# Patient Record
Sex: FEMALE | Race: WHITE | NOT HISPANIC OR LATINO | Employment: OTHER | URBAN - METROPOLITAN AREA
[De-identification: names, ages, dates, MRNs, and addresses within clinical notes are randomized per-mention and may not be internally consistent; named-entity substitution may affect disease eponyms.]

---

## 2017-06-02 DIAGNOSIS — Z12.31 ENCOUNTER FOR SCREENING MAMMOGRAM FOR MALIGNANT NEOPLASM OF BREAST: ICD-10-CM

## 2017-07-11 ENCOUNTER — HOSPITAL ENCOUNTER (OUTPATIENT)
Dept: RADIOLOGY | Age: 67
Discharge: HOME | End: 2017-07-11
Payer: MEDICARE

## 2017-07-11 ENCOUNTER — GENERIC CONVERSION - ENCOUNTER (OUTPATIENT)
Dept: OTHER | Facility: OTHER | Age: 67
End: 2017-07-11

## 2017-07-11 DIAGNOSIS — Z12.31 ENCOUNTER FOR SCREENING MAMMOGRAM FOR MALIGNANT NEOPLASM OF BREAST: ICD-10-CM

## 2017-07-11 PROCEDURE — G0202 SCR MAMMO BI INCL CAD: HCPCS

## 2017-07-11 PROCEDURE — 77063 BREAST TOMOSYNTHESIS BI: CPT

## 2017-08-17 ENCOUNTER — ALLSCRIPTS OFFICE VISIT (OUTPATIENT)
Dept: OTHER | Facility: OTHER | Age: 67
End: 2017-08-17

## 2017-08-17 DIAGNOSIS — Z13.820 ENCOUNTER FOR SCREENING FOR OSTEOPOROSIS: ICD-10-CM

## 2017-08-17 DIAGNOSIS — Z13.220 ENCOUNTER FOR SCREENING FOR LIPOID DISORDERS: ICD-10-CM

## 2017-08-17 DIAGNOSIS — Z13.1 ENCOUNTER FOR SCREENING FOR DIABETES MELLITUS: ICD-10-CM

## 2017-08-17 DIAGNOSIS — R03.0 ELEVATED BLOOD PRESSURE READING WITHOUT DIAGNOSIS OF HYPERTENSION: ICD-10-CM

## 2017-08-17 DIAGNOSIS — Z00.00 ENCOUNTER FOR GENERAL ADULT MEDICAL EXAMINATION WITHOUT ABNORMAL FINDINGS: ICD-10-CM

## 2017-08-18 ENCOUNTER — TRANSCRIBE ORDERS (OUTPATIENT)
Dept: LAB | Facility: CLINIC | Age: 67
End: 2017-08-18

## 2017-08-18 ENCOUNTER — APPOINTMENT (OUTPATIENT)
Dept: LAB | Facility: CLINIC | Age: 67
End: 2017-08-18
Payer: MEDICARE

## 2017-08-18 ENCOUNTER — GENERIC CONVERSION - ENCOUNTER (OUTPATIENT)
Dept: OTHER | Facility: OTHER | Age: 67
End: 2017-08-18

## 2017-08-18 DIAGNOSIS — Z00.00 ENCOUNTER FOR GENERAL ADULT MEDICAL EXAMINATION WITHOUT ABNORMAL FINDINGS: ICD-10-CM

## 2017-08-18 DIAGNOSIS — Z13.220 ENCOUNTER FOR SCREENING FOR LIPOID DISORDERS: ICD-10-CM

## 2017-08-18 DIAGNOSIS — Z13.1 ENCOUNTER FOR SCREENING FOR DIABETES MELLITUS: ICD-10-CM

## 2017-08-18 DIAGNOSIS — R03.0 ELEVATED BLOOD PRESSURE READING WITHOUT DIAGNOSIS OF HYPERTENSION: ICD-10-CM

## 2017-08-18 LAB
ALBUMIN SERPL BCP-MCNC: 3.4 G/DL (ref 3.5–5)
ALP SERPL-CCNC: 57 U/L (ref 46–116)
ALT SERPL W P-5'-P-CCNC: 22 U/L (ref 12–78)
ANION GAP SERPL CALCULATED.3IONS-SCNC: 5 MMOL/L (ref 4–13)
AST SERPL W P-5'-P-CCNC: 15 U/L (ref 5–45)
BILIRUB SERPL-MCNC: 0.7 MG/DL (ref 0.2–1)
BUN SERPL-MCNC: 17 MG/DL (ref 5–25)
CALCIUM SERPL-MCNC: 8.4 MG/DL (ref 8.3–10.1)
CHLORIDE SERPL-SCNC: 103 MMOL/L (ref 100–108)
CHOLEST SERPL-MCNC: 218 MG/DL (ref 50–200)
CO2 SERPL-SCNC: 30 MMOL/L (ref 21–32)
CREAT SERPL-MCNC: 0.73 MG/DL (ref 0.6–1.3)
ERYTHROCYTE [DISTWIDTH] IN BLOOD BY AUTOMATED COUNT: 13.1 % (ref 11.6–15.1)
GFR SERPL CREATININE-BSD FRML MDRD: 86 ML/MIN/1.73SQ M
GLUCOSE P FAST SERPL-MCNC: 97 MG/DL (ref 65–99)
HCT VFR BLD AUTO: 39.7 % (ref 34.8–46.1)
HDLC SERPL-MCNC: 64 MG/DL (ref 40–60)
HGB BLD-MCNC: 13.1 G/DL (ref 11.5–15.4)
LDLC SERPL CALC-MCNC: 143 MG/DL (ref 0–100)
MCH RBC QN AUTO: 30.1 PG (ref 26.8–34.3)
MCHC RBC AUTO-ENTMCNC: 33 G/DL (ref 31.4–37.4)
MCV RBC AUTO: 91 FL (ref 82–98)
PLATELET # BLD AUTO: 234 THOUSANDS/UL (ref 149–390)
PMV BLD AUTO: 9.8 FL (ref 8.9–12.7)
POTASSIUM SERPL-SCNC: 4.4 MMOL/L (ref 3.5–5.3)
PROT SERPL-MCNC: 6.5 G/DL (ref 6.4–8.2)
RBC # BLD AUTO: 4.35 MILLION/UL (ref 3.81–5.12)
SODIUM SERPL-SCNC: 138 MMOL/L (ref 136–145)
TRIGL SERPL-MCNC: 56 MG/DL
TSH SERPL DL<=0.05 MIU/L-ACNC: 3.05 UIU/ML (ref 0.36–3.74)
WBC # BLD AUTO: 6.58 THOUSAND/UL (ref 4.31–10.16)

## 2017-08-18 PROCEDURE — 80053 COMPREHEN METABOLIC PANEL: CPT

## 2017-08-18 PROCEDURE — 85027 COMPLETE CBC AUTOMATED: CPT

## 2017-08-18 PROCEDURE — 84443 ASSAY THYROID STIM HORMONE: CPT

## 2017-08-18 PROCEDURE — 36415 COLL VENOUS BLD VENIPUNCTURE: CPT

## 2017-08-18 PROCEDURE — 80061 LIPID PANEL: CPT

## 2017-09-05 ENCOUNTER — HOSPITAL ENCOUNTER (OUTPATIENT)
Dept: RADIOLOGY | Age: 67
Discharge: HOME | End: 2017-09-05
Payer: MEDICARE

## 2017-09-05 DIAGNOSIS — Z13.820 ENCOUNTER FOR SCREENING FOR OSTEOPOROSIS: ICD-10-CM

## 2017-09-05 PROCEDURE — 77080 DXA BONE DENSITY AXIAL: CPT

## 2017-09-06 ENCOUNTER — GENERIC CONVERSION - ENCOUNTER (OUTPATIENT)
Dept: OTHER | Facility: OTHER | Age: 67
End: 2017-09-06

## 2018-01-10 NOTE — PROGRESS NOTES
Assessment    1  Encounter for preventive health examination (V70 0) (Z00 00)   2  Medicare welcome exam (V70 0) (Z00 00)    Plan  Health Maintenance    · Always use a seat belt and shoulder strap when riding or driving a motor vehicle ;  Status:Complete;   Done: 57ATN5129   · Begin a limited exercise program ; Status:Complete;   Done: 32QPP7815   · Decreasing the stress in your life may help your condition improve ; Status:Complete;    Done: 05LZV6042   · Eat a low fat and low cholesterol diet ; Status:Complete;   Done: 45CQY8820   · Regular aerobic exercise can help reduce stress ; Status:Complete;   Done: 95DGN2436   · Stretch and warm up your muscles during the first 10 minutes , then cool down your  muscles for the last 10 minutes of exercise ; Status:Complete;   Done: 82ZSW1688   · There are many exercise options for seniors ; Status:Complete;   Done: 31QDP1765   · There ways to avoid falling ; Status:Complete;   Done: 40FBU4855   · These are things you can do to prevent falls in and around the home ; Status:Complete;    Done: 32NTX2591   · Use a sun block product with an SPF of 15 or more ; Status:Complete;   Done:  56HXR2302   · Call [phone] if: You find a new or different kind of lump in your breast ;  Status:Complete;   Done: 26ZCQ4022   · Call [phone] if: You have any warning signs of skin cancer ; Status:Complete;    Done: 14KUX0334   · Call 911 if: You experience a new kind of chest pain (angina) or pressure ;  Status:Complete;   Done: 81CLP3630  Medicare welcome exam    · EKG/ECG- POC; Status:Active - Perform Order; Requested for:25Jgj4795; Sore throat    · Tyree Berry MD, Jeremiah Mcgraw (Otolaryngology) Physician Referral  Consult  Status: Hold For -  Scheduling  Requested for: 55UVW9573  are Referring to a non- Preferred Provider : Scheduling access issues  Care Summary provided  : Yes    Discussion/Summary  Impression: Welcome to Medicare Visit       Cardiovascular screening and counseling: the risks and benefits of screening were discussed, screening is current, counseling was given on maintaining a healthy diet and counseling was given on maintaining a healthy weight  Diabetes screening and counseling: the risks and benefits of screening were discussed, screening is current, counseling was given on maintaining a healthy diet and counseling was given on maintaining a healthy weight  Colorectal cancer screening and counseling: screening is current  Breast cancer screening and counseling: screening is current  Cervical cancer screening and counseling: screening not indicated and Hysterectomy  GYN 2016  Osteoporosis screening and counseling: screening is current and Through [facility] in March 2016  Abdominal aortic aneurysm screening and counseling: screening is current  Glaucoma screening and counseling: screening is current  HIV screening and counseling: screening not indicated  Immunizations: the patient declines the influenza vaccination, the patient declines the pneumococcal vaccination, hepatitis B vaccination series is not indicated at this time due to the patient's low risk of ruel the disease, the patient declines the Zostavax vaccine and Tdap vaccination up to date  Advance Directive Planning: complete and up to date  Advice and education were given regarding alcohol use, fall risk reduction, nutrition (non-diabetic), seat belt use, skin self-exam and sunscreen use  (None needed) Medical Equipment/Suppliers: None used  Patient Discussion: plan discussed with the patient  Self Referrals: No      Chief Complaint  Welcome to Medicare      History of Present Illness  HPI: Since last visit she had a colonoscopy and three band ligations of internal hemorrhoids with resolution of the rectal bleeding  She had [facility] screening in March 2016 with no aneurysm seen  Her throat still feels a little "tight" after her episode several weeks ago   She would like to see ENT  Had lost her voice with it  Lost her voice at the time  Never smoked  Welcome to [employer] and [facility] Visits: The patient is being seen for the welcome to medicare visit  Medicare Screening and Risk Factors   Hospitalizations: she has been previously hospitalizied and she has been hospitalized 4 times  Once per lifetime medicare screening tests: ECG has not been done and AAA screening US (3/16 negative [facility])  Medicare Screening Tests Risk Questions   Abdominal aortic aneurysm risk assessment: none indicated  Osteoporosis risk assessment: , female gender and over 48years of age  HIV risk assessment: none indicated  Drug and Alcohol Use: The patient has never smoked cigarettes  The patient reports never drinking alcohol  Alcohol concern:   The patient has no concerns about alcohol abuse  She has never used illicit drugs  Diet and Physical Activity: Current diet includes well balanced meals, low salt food choices and 1-2 cups of tea per day  She exercises 3-4 times per week  Exercise: aerobics  Mood Disorder and Cognitive Impairment Screening: She denies feeling down, depressed, or hopeless over the past two weeks  She denies feeling little interest or pleasure in doing things over the past two weeks  Cognitive impairment screening: denies difficulty learning/retaining new information, denies difficulty handling complex tasks, denies difficulty with reasoning, denies difficulty with spatial ability and orientation, denies difficulty with language and denies difficulty with behavior  Functional Ability/Level of Safety: Hearing is normal bilaterally and a hearing aid is not used  The patient is currently able to do activities of daily living without limitations, able to participate in social activities without limitations and able to drive without limitations   Activities of daily living details: does not need help using the phone, no transportation help needed, does not need help shopping, no meal preparation help needed, does not need help doing housework, does not need help doing laundry, does not need help managing medications and does not need help managing money  Fall risk factors: The patient fell 0 times in the past 12 months , no polypharmacy, no alcohol use, no antidepressant use, no deconditioning, no sedative use, no urinary incontinence, no antihypertensive use and no previous fall  Home safety risk factors:  no unfamiliar surroundings, no loose rugs, no poor household lighting, no uneven floors, no household clutter, grab bars in the bathroom and handrails on the stairs  Advance Directives: Advance directives: living will, but no durable power of  for health care directives and no advance directives  Co-Managers and Medical Equipment/Suppliers: See Patient Care Team      Patient Care Team    Care Team Member Role Specialty Office Number   Rocío Armando MD  Orthopedic Surgery [phone]   Mary ZAMORA  Family Medicine [phone]     Review of Systems    Constitutional: no fever and no malaise  Eyes: no eye pain, eyes not red, no itching of the eyes and no blurred vision  ENT: as noted in HPI  Cardiovascular: no chest pain, no palpitations and no lower extremity edema  Respiratory: no shortness of breath, no wheezing and no cough  Gastrointestinal: no abdominal pain, no abdominal bloating and no abdominal cramps  Genitourinary: no dysuria, no urinary frequency and no urinary urgency  Musculoskeletal: no diffuse joint pain, no generalized muscle aches, no joint swelling and no joint stiffness  Integumentary and Breasts: no rashes and no skin lesions  Psychiatric: no anxiety and no depression  Endocrine: no hot flashes  Hematologic and Lymphatic: no tendency for easy bleeding and no tendency for easy bruising  Active Problems    1  Blood pressure elevated without history of HTN (796 2) (R03 0)   2   Primary osteoarthritis of right knee (715 16) (M17 11)   3  Refused influenza vaccine (V64 06) (Z28 21)   4  Sore throat (462) (J02 9)    Past Medical History    · Acute upper respiratory infection (465 9) (J06 9)   · History of Depression (311) (F32 9)   · History of Fracture Of The Ankle (824 8)   · History of Ganglion Of The Left Wrist (727 42)   · History of fatigue (V13 89) (Q36 148)   · History of hyperlipidemia (V12 29) (Z86 39)   · History of X-Ray Wrist Fracture    Surgical History    · History of Colonoscopy (Fiberoptic)   · History of Hysterectomy   · History of Multiple Ligations Of Internal Hemorrhoids   · History of Neuroplasty Decompression Median Nerve At Carpal Tunnel   · History of Total Abdominal Hysterectomy   · History of Tubal Ligation    Family History  Mother    · Family history of Mother  At Age 66  Father    · Family history of Father  At Age 80  Sister    · Family history of Coronary Artery Disease (V17 49)  Brother    · Family history of Esophageal Cancer (V16 0)   · Family history of Liver Cancer   · Family history of Suicide Completion    Social History    · Educational Level - Has High School Diploma   · Marital History - Currently    · 3 daughters   · Never A Smoker   · Never Drank Alcohol   · Occupation:   · housewife    Current Meds   1  Calcium 600-200 MG-UNIT Oral Tablet; Therapy: (SOZYPVQD:19LNA4370) to Recorded   2  Fluocinolone Acetonide 0 025 % External Ointment; USE TOPICALLY TWICE A DAY AS   NEEDED; Therapy: 37BYJ8694 to (Last Rx:09Aao1208)  Requested for: 98KUY2136 Ordered   3  Multiple Vitamins Oral Tablet; TAKE 1 TABLET DAILY; Therapy: 90OHN7200 to (Evaluate:03Ifh1010) Recorded    Allergies    1   Sulfa Drugs    Immunizations   1    Influenza  Permanently Deferred: Medical Deferral, PT REFUSED, 20KXH9835    Tdap  2013     Vitals  Signs    Systolic: 104  Diastolic: 84  Heart Rate: 72  Temperature: 98 1 F  Pain Scale: 2  Height: 5 ft 4 in  Weight: 156 lb 4 96 oz  BMI Calculated: 26 83  BSA Calculated: 1 77    Physical Exam    Constitutional   General appearance: No acute distress, well appearing and well nourished  Head and Face   Head and face: Normal     Eyes   Conjunctiva and lids: No swelling, erythema or discharge  Pupils and irises: Equal, round, reactive to light  Ears, Nose, Mouth, and Throat   External inspection of ears and nose: Normal     Otoscopic examination: Tympanic membranes translucent with normal light reflex  Canals patent without erythema  (Non-occluding wax AU)   Hearing: Normal     Nasal mucosa, septum, and turbinates: Normal without edema or erythema  Lips, teeth, and gums: Normal, good dentition  Oropharynx: Normal with no erythema, edema, exudate or lesions  Neck   Neck: Supple, symmetric, trachea midline, no masses  Thyroid: Normal, no thyromegaly  Pulmonary   Respiratory effort: No increased work of breathing or signs of respiratory distress  Auscultation of lungs: Clear to auscultation  Cardiovascular   Palpation of heart: Normal PMI, no thrills  Auscultation of heart: Normal rate and rhythm, normal S1 and S2, no murmurs  Carotid pulses: 2+ bilaterally  no bruit heard over the right carotid and no bruit heard over the left carotid  Abdominal aorta: Normal     Femoral pulses: 2+ bilaterally  Pedal pulses: 2+ bilaterally  Examination of extremities for edema and/or varicosities: Normal     Abdomen   Abdomen: Non-tender, no masses  Liver and spleen: No hepatomegaly or splenomegaly  Lymphatic   Palpation of lymph nodes in neck: No lymphadenopathy  Palpation of lymph nodes in groin: No lymphadenopathy  Musculoskeletal   Gait and station: Normal     Digits and nails: Normal without clubbing or cyanosis  Joints, bones, and muscles: Normal     Skin   Skin and subcutaneous tissue: Normal without rashes or lesions  Neurologic   Cranial nerves: Cranial nerves II-XII intact      Cortical function: Normal mental status  Psychiatric   Judgment and insight: Normal     Orientation to person, place, and time: Normal     Recent and remote memory: Intact      Mood and affect: Normal        Results/Data  A 12 lead ECG was performed and was normal       Signatures   Electronically signed by : SVETA Santizo ; Aug 16 2016  8:38AM EST                       (Author)

## 2018-01-10 NOTE — RESULT NOTES
Verified Results  MAMMO SCREENING BILATERAL W 3D & CAD [id number] 07:05AM Benjamen Flight Order Number: [id number]    - Patient Instructions: To schedule this appointment, please contact Central Scheduling at [phone]  Do not wear any perfume, powder, lotion or deodorant on breast or underarm area  Please bring your doctors order, referral (if needed) and insurance information with you on the day of the test  Failure to bring this information may result in this test being rescheduled  Arrive 15 minutes prior to your appointment time to register  On the day of your test, please bring any prior mammogram or breast studies with you that were not performed at a [facility]  Failure to bring prior exams may result in your test needing to be rescheduled  Test Name Result Flag Reference   MAMMO SCREENING BILATERAL W 3D & CAD (Report)     Patient History:   Family history of breast cancer at age 48 in maternal half    sister, prostate cancer at age 61 in brother  Took hormonal contraceptives for 3 months  Patient has never smoked  Patient's BMI is 25 9  Reason for exam: screening, asymptomatic  Mammo Screening Bilateral W DBT and CAD: July 11, 2017 - Check In   #: [de-identified]   2D/3D Procedure   3D views: Bilateral MLO view(s) were taken  2D views: Bilateral CC view(s) were taken  Technologist: RT Cary(R)(M)   Prior study comparison: June 28, 2016, mammo screening bilateral    W CAD performed at [address] [place] [address]  June 23, 2015,    digital bilateral screening mammogram performed at [address] [place] [address]  June 17, 2014, digital bilateral screening    mammogram performed at [address] [place] [address]  June 11, 2013, digital bilateral screening mammogram performed at [address] [place] [address]  June 8, 2012, digital bilateral    screening mammogram performed at [address] [place] [address]       The breast tissue is heterogeneously dense, potentially limiting    the sensitivity of mammography  Patient risk, included in this    report, assists in determining the appropriate screening regimen    (such as 3-D mammography or the inclusion of automated breast    ultrasound or MRI)  3-D mammography may also remain indicated as    screening  No dominant soft tissue mass, architectural distortion or    suspicious calcifications are noted in either breast   The skin    and nipple contours are within normal limits  No significant changes when compared with prior studies  ACR BI-RADSï¾® Assessments: BiRad:1 - Negative     Recommendation:   Routine screening mammogram of both breasts in 1 year  A    reminder letter will be scheduled  The patient is scheduled in a reminder system  8-10% of cancers will be missed on mammography  Management of a    palpable abnormality must be based on clinical grounds  Patients    will be notified of their results via letter from our facility  Accredited by [employer] of Radiology and FDA       Transcription Location: ANDREW Olivia 98: [id number]     Risk Value(s):   Tyrer-Cuzick 10 Year: 3 500%, Tyrer-Cuzick Lifetime: 7 000%,    Myriad Table: 1 5%, YEIMI 5 Year: 1 3%, NCI Lifetime: 4 8%

## 2018-01-10 NOTE — RESULT NOTES
Verified Results  (1) CBC/ PLT (NO DIFF) [id number] 08:00AM Reagan Lynn Order Number: [id number]     Test Name Result Flag Reference   HEMATOCRIT 39 7 %  34 8-46 1   HEMOGLOBIN 13 1 g/dL  11 5-15 4   MCHC 33 0 g/dL  31 4-37 4   MCH 30 1 pg  26 8-34 3   MCV 91 fL  82-98   PLATELET COUNT 787 Thousands/uL  149-390   RBC COUNT 4 35 Million/uL  3 81-5 12   RDW 13 1 %  11 6-15 1   WBC COUNT 6 58 Thousand/uL  4 31-10 16   MPV 9 8 fL  8 9-12 7     (1) COMPREHENSIVE METABOLIC PANEL [id number] 37:21DU Reagan Lynn Order Number: [id number]     Test Name Result Flag Reference   SODIUM 138 mmol/L  136-145   POTASSIUM 4 4 mmol/L  3 5-5 3   CHLORIDE 103 mmol/L  100-108   CARBON DIOXIDE 30 mmol/L  21-32   ANION GAP (CALC) 5 mmol/L  4-13   BLOOD UREA NITROGEN 17 mg/dL  5-25   CREATININE 0 73 mg/dL  0 60-1 30   Standardized to IDMS reference method   CALCIUM 8 4 mg/dL  8 3-10 1   BILI, TOTAL 0 70 mg/dL  0 20-1 00   ALK PHOSPHATAS 57 U/L     ALT (SGPT) 22 U/L  12-78   Specimen collection should occur prior to Sulfasalazine administration due to the potential for falsely depressed results  AST(SGOT) 15 U/L  5-45   Specimen collection should occur prior to Sulfasalazine administration due to the potential for falsely depressed results  ALBUMIN 3 4 g/dL L 3 5-5 0   TOTAL PROTEIN 6 5 g/dL  6 4-8 2   eGFR 86 ml/min/1 73sq m     National Kidney Disease Education Program recommendations are as follows:  GFR calculation is accurate only with a steady state creatinine  Chronic Kidney disease less than 60 ml/min/1 73 sq  meters  Kidney failure less than 15 ml/min/1 73 sq  meters  GLUCOSE FASTING 97 mg/dL  65-99   Specimen collection should occur prior to Sulfasalazine administration due to the potential for falsely depressed results  Specimen collection should occur prior to Sulfapyridine administration due to the potential for falsely elevated results       (1) LIPID PANEL, FASTING [id number] 08: Christianne Bain Order Number: [id number]     Test Name Result Flag Reference   CHOLESTEROL 218 mg/dL H    HDL,DIRECT 64 mg/dL H 40-60   Specimen collection should occur prior to Metamizole administration due to the potential for falsley depressed results  LDL CHOLESTEROL CALCULATED 143 mg/dL H 0-100   Triglyceride:        Normal ??? ??? ??? ??? ??? ??? ??? <150 mg/dl   ??? ??? ???Borderline High ??? ??? 150-199 mg/dl   ??? ??? ? ?? High ??? ??? ??? ??? ??? ??? ??? 200-499 mg/dl   ??? ??? ? ??Very High ??? ??? ??? ??? ??? >499 mg/dl      Cholesterol:       Desirable ??? ??? ??? ??? <200 mg/dl   ??? ??? Borderline High ??? 200-239 mg/dl   ??? ??? High ??? ??? ??? ??? ??? ??? >239 mg/dl      HDL Cholesterol:       High ??? ???>59 mg/dL   ??? ??? Low ??? ??? <41 mg/dL      This screening LDL is a calculated result  It does not have the accuracy of the Direct Measured LDL in the monitoring of patients with hyperlipidemia and/or statin therapy  Direct Measure LDL (NDG308) must be ordered separately in these patients  TRIGLYCERIDES 56 mg/dL  <=150   Specimen collection should occur prior to N-Acetylcysteine or Metamizole administration due to the potential for falsely depressed results  (1) TSH WITH FT4 REFLEX 26Enx2635 08:00AM Mikayla Barahona Order Number: [id number]     Test Name Result Flag Reference   TSH 3 046 uIU/mL  0 358-3 740   Patients undergoing fluorescein dye angiography may retain small amounts of fluorescein in the body for 48-72 hours post procedure  Samples containing fluorescein can produce falsely depressed TSH values  If the patient had this procedure,a specimen should be resubmitted post fluorescein clearance            The recommended reference ranges for TSH during pregnancy are as follows:  First trimester 0 1 to 2 5 uIU/mL  Second trimester  0 2 to 3 0 uIU/mL  Third trimester 0 3 to 3 0 uIU/m

## 2018-01-12 NOTE — RESULT NOTES
Verified Results  * DXA BONE DENSITY SPINE HIP AND PELVIS 96Vuq9611 04:34PM Loly John Order Number: [id number]    - Patient Instructions: To schedule this appointment, please contact Central Scheduling at [phone]  Test Name Result Flag Reference   DXA BONE DENSITY SPINE HIP AND PELVIS (Report)     CENTRAL DXA SCAN     CLINICAL HISTORY:  77year old post-menopausal  female risk factors include estrogen deficient, osteopenia follow-up     TECHNIQUE: Bone densitometry was performed using a Horizon A bone densitometer  Regions of interest appear properly placed  There are no obvious fractures or other confounding variables which could limit the study  COMPARISON: 2009     RESULTS:    LUMBAR SPINE: L1-L4:   BMD 0 913 gm/cm2   T-score -1 2   Z-score 0 7     LEFT TOTAL HIP:   BMD 0 797 gm/cm2   T-score -1 2   Z-score 0 1     LEFT FEMORAL NECK:   BMD 0 660 gm/cm2   T-score -1 7   Z-score -0 1             IMPRESSION:   1  Based on the [facility] classification, the T-score of -1 7 in the left femoral neck is consistent with low bone mineral density  2  Since the prior study, there has been an insignificant decrease of the BMD in the lumbar spine of -0 014 gm/cm2 or -1 5%  In the left hip, there has been a significant decrease in BMD of -0 092 gm/cm2 or -10 3%  This decrease in the left hip does    exceed our own least significant change and, therefore, is statistically significant within 95% confidence level  3  Any secondary causes of low bone mineral density should be excluded prior to treatment, if clinically indicated  4  A daily intake of at least 1200 mg calcium and 800 to 1000 IU of Vitamin D, as well as weight bearing and muscle strengthening exercise, fall prevention and avoidance of tobacco and excessive alcohol intake as basic preventive measures are suggested  5  Repeat DXA in 18 - 24 months, on the same machine, as clinically indicated           The 10 year risk of hip fracture is 2 2%, with the 10 year risk of major osteoporotic fracture being 16%, as calculated by the WHO fracture risk assessment tool (FRAX)  The current NOF guidelines recommend treating patients with FRAX 10 year risk score    of >3% for hip fracture and >20% for major osteoporotic fracture  WHO CLASSIFICATION:   Normal (a T-score of -1 0 or higher)   Low bone mineral density (a T-score of less than -1 0 but higher than -2 5)   Osteoporosis (a T-score of -2 5 or less)   Severe osteoporosis (a T-score of -2 5 or less with a fragility fracture)             Workstation performed: [id number]     Signed by:   Fabienne Correa MD   9/6/17

## 2018-01-12 NOTE — RESULT NOTES
Verified Results  (1) URINALYSIS (will reflex a microscopy if leukocytes, occult blood, protein or nitrites are not within normal limits) [id number] 08:28AM [employer]     Test Name Result Flag Reference   COLOR Yellow     CLARITY Clear     SPECIFIC GRAVITY UA 1 010  1 003-1 030   PH UA 7 0  4 5-8 0   LEUKOCYTE ESTERASE UA Moderate A Negative   NITRITE UA Negative  Negative   PROTEIN UA Negative mg/dl  Negative   GLUCOSE UA Negative mg/dl  Negative   KETONES UA Negative mg/dl  Negative   UROBILINOGEN UA 0 2 E U /dl  0 2, 1 0 E U /dl   BILIRUBIN UA Negative  Negative   BLOOD UA Negative  Negative     (1) URINALYSIS (will reflex a microscopy if leukocytes, occult blood, protein or nitrites are not within normal limits) [id number] 08:28AM [employer]     Test Name Result Flag Reference   BACTERIA None Seen /hpf  None Seen, Occasional   EPITHELIAL CELLS Occasional /hpf  None Seen, Occasional   RBC UA 0-1 /hpf A None Seen   WBC UA 1-2 /hpf A None Seen     (1) COMPREHENSIVE METABOLIC PANEL [id number] 22:58HR [employer]     Test Name Result Flag Reference   GLUCOSE,RANDM 97 mg/dL     If the patient is fasting, the ADA then defines impaired fasting glucose as > 100 mg/dL and diabetes as > or equal to 123 mg/dL     SODIUM 138 mmol/L  136-145   POTASSIUM 4 0 mmol/L  3 5-5 3   CHLORIDE 103 mmol/L  100-108   CARBON DIOXIDE 30 mmol/L  21-32   ANION GAP (CALC) 5 mmol/L  4-13   BLOOD UREA NITROGEN 15 mg/dL  5-25   CREATININE 0 70 mg/dL  0 60-1 30   Standardized to IDMS reference method   CALCIUM 8 4 mg/dL  8 3-10 1   BILI, TOTAL 0 50 mg/dL  0 20-1 00   ALK PHOSPHATAS 57 U/L     ALT (SGPT) 21 U/L  12-78   AST(SGOT) 15 U/L  5-45   ALBUMIN 3 6 g/dL  3 5-5 0   TOTAL PROTEIN 6 7 g/dL  6 4-8 2   eGFR Non-African American      >60 0 ml/min/1 73sq m   [facility] Disease Education Program recommendations are as follows:  GFR calculation is accurate only with a steady state creatinine  Chronic Kidney disease less than 60 ml/min/1 73 sq  meters  Kidney failure less than 15 ml/min/1 73 sq  meters  (1) TSH WITH FT4 REFLEX 22Jun2016 07:55AM [employer]     Test Name Result Flag Reference   TSH 4 843 uIU/mL H 0 358-3 740   A FT4 has been ordered  Patients undergoing fluorescein dye angiography may retain small amounts of fluorescein in the body for 48-72 hours post procedure  Samples containing fluorescein can produce falsely depressed TSH values  If the patient had this procedure,a specimen should be resubmitted post fluorescein clearance  The recommended reference ranges for TSH during pregnancy are as follows:  First trimester 0 1 to 2 5 uIU/mL  Second trimester  0 2 to 3 0 uIU/mL  Third trimester 0 3 to 3 0 uIU/m     (1) LIPID PANEL, FASTING 22Jun2016 07:55AM [facility]     Test Name Result Flag Reference   CHOLESTEROL 201 mg/dL H    HDL,DIRECT 55 mg/dL  40-60   Specimen collection should occur prior to Metamizole administration due to the potential for falsely depressed results  LDL CHOLESTEROL CALCULATED 132 mg/dL H 0-100   Triglyceride:         Normal              <150 mg/dl       Borderline High    150-199 mg/dl       High               200-499 mg/dl       Very High          >499 mg/dl  Cholesterol:         Desirable        <200 mg/dl      Borderline High  200-239 mg/dl      High             >239 mg/dl  HDL Cholesterol:        High    >59 mg/dL      Low     <41 mg/dL  LDL CALCULATED:    This screening LDL is a calculated result  It does not have the accuracy of the Direct Measured LDL in the monitoring of patients with hyperlipidemia and/or statin therapy  Direct Measure LDL (DBN222) must be ordered separately in these patients  TRIGLYCERIDES 72 mg/dL  <=150   Specimen collection should occur prior to N-Acetylcysteine or Metamizole administration due to the potential for falsely depressed results       (1) TSH WITH FT4 REFLEX 22Jun2016 07:55AM [facility]     Test Name Result Flag Reference   T4,FREE 0 94 ng/dL  0 76-1 46     (1) CBC/PLT/DIFF [id number] 07:30AM Rosario Mcgee     Test Name Result Flag Reference   WBC COUNT 5 78 Thousand/uL  4 31-10 16   RBC COUNT 4 51 Million/uL  3 81-5 12   HEMOGLOBIN 13 6 g/dL  11 5-15 4   HEMATOCRIT 41 1 %  34 8-46  1   MCV 91 fL  82-98   MCH 30 2 pg  26 8-34 3   MCHC 33 1 g/dL  31 4-37 4   RDW 13 2 %  11 6-15 1   MPV 9 9 fL  8 9-12 7   PLATELET COUNT 218 Thousands/uL  149-390   NEUTROPHILS RELATIVE PERCENT 55 %  43-75   LYMPHOCYTES RELATIVE PERCENT 36 %  14-44   MONOCYTES RELATIVE PERCENT 7 %  4-12   EOSINOPHILS RELATIVE PERCENT 2 %  0-6   BASOPHILS RELATIVE PERCENT 0 %  0-1   NEUTROPHILS ABSOLUTE COUNT 3 18 Thousands/?L  1 85-7 62   LYMPHOCYTES ABSOLUTE COUNT 2 08 Thousands/?L  0 60-4 47   MONOCYTES ABSOLUTE COUNT 0 39 Thousand/?L  0 17-1 22   EOSINOPHILS ABSOLUTE COUNT 0 11 Thousand/?L  0 00-0 61   BASOPHILS ABSOLUTE COUNT 0 02 Thousands/?L  0 00-0 10       Discussion/Summary   Everything looks OK  Cholesterol is much improved   One thyroid test (TSH) is very minimally abnormal but the other thyroid test is normal  Call if you remain fatigued; otherwise I'll see you for your physical  Dr Genevieve Kaiser

## 2018-01-12 NOTE — PROGRESS NOTES
Assessment    1  Screening for osteoporosis (V82 81) (Z13 820)   2  Medicare welcome exam (V70 0) (Z00 00)   3  Screening for diabetes mellitus (V77 1) (Z13 1)   4  Encounter for screening for lipid disorder (V77 91) (Z13 220)   5  Need for pneumococcal vaccination (V03 82) (Z23)   6  Vaccination refused by patient (V64 06) (Z28 21)    Plan  Blood pressure elevated without history of HTN, Health Maintenance, Encounter for  screening for lipid disorder, Screening for diabetes mellitus    · (1) CBC/ PLT (NO DIFF); Status:Active; Requested for:[id number];    · (1) TSH WITH FT4 REFLEX; Status:Active; Requested for:26Qyb1955;   Depression screening    · *VB - PHQ-9 Tool; Status:Complete;   Done: 14XVO0993 08:04AM  Health Maintenance, Encounter for screening for lipid disorder, Screening for diabetes  mellitus    · (1) COMPREHENSIVE METABOLIC PANEL; Status:Active; Requested for:93Vga4550;    · (1) LIPID PANEL, FASTING; Status:Active; Requested for:71Vzc9201;   Need for pneumococcal vaccination    · Prevnar 13 Intramuscular Suspension  Screening for genitourinary condition    · *VB - Urinary Incontinence Screen (Dx Z13 89 Screen for UI); Status:Complete;   Done:  17FRD0268 08:11AM  Screening for neurological condition    · *VB - Fall Risk Assessment  (Dx Z13 89 Screen for Neurologic Disorder);  Status:Complete;   Done: 20VAO8505 08:13AM  Screening for osteoporosis    · * DXA BONE DENSITY SPINE HIP AND PELVIS; Status:Active; Requested  for:69Rdt1763;     Discussion/Summary  Impression: Subsequent Annual Wellness Visit, with preventive exam as well as age and risk appropriate counseling completed       Cardiovascular screening and counseling: the risks and benefits of screening were discussed, counseling was given on maintaining a healthy diet, counseling was given on maintaining a healthy weight, counseling was given on ways to improve cholesterol, counseling was given on ways to improve blood pressure, counseling was given on ways to improve exercise tolerance, due for a lipid panel and Dx - V81 2 Screen for CV Disorder  Diabetes screening and counseling: the risks and benefits of screening were discussed, counseling was given on maintaining a healthy diet, counseling was given on maintaining a healthy weight, counseling was given on ways to improve physical activity, due for blood glucose and Dx - V77 1 Screen for DM  Colorectal cancer screening and counseling: the risks and benefits of screening were discussed, screening is current, counseling was given on ways to eat a high fiber diet and Dx - V76 51 Screen for CRC  Breast cancer screening and counseling: the risks and benefits of screening were discussed and screening is current  Cervical cancer screening and counseling: screening not indicated  Osteoporosis screening and counseling: the risks and benefits of screening were discussed, counseling was given on obtaining adequate amounts of calcium and vitamin D on a daily basis, counseling was given on the importance of regular weightbearing exercise and due for DXA axial skeleton  Immunizations: the patient declines the influenza vaccination, the risks and benefits of pneumococcal vaccination were discussed with the patient, pneumococcal vaccine due today, the risks and benefits of the Zostavax vaccine were discussed with the patient and the patient declines the Zostavax vaccine  Advance Directive Planning: not complete, paperwork and instructions were given to the patient, she was encouraged to follow-up with me to discuss her questions and/or decisions  Patient Discussion: plan discussed with the patient, follow-up visit needed in one year  Possible side effects of new medications were reviewed with the patient/guardian today  The treatment plan was reviewed with the patient/guardian   The patient/guardian understands and agrees with the treatment plan      Chief Complaint  MEDICARE WELLNESS VISIT      History of Present Illness  HPI: Colonoscopy: 2016, repeat 10 yrs  Mammogram: 2017  DXA Scan: ?? Prevnar-13: never  Pneumovax: never  Zostavax: declines   Welcome to [employer] and Wellness Visits: The patient is being seen for the welcome to medicare visit  Medicare Screening and Risk Factors   Hospitalizations: no previous hospitalizations  Medicare Screening Tests Risk Questions   Drug and Alcohol Use: The patient has never smoked cigarettes  The patient reports never drinking alcohol  She has never used illicit drugs  Diet and Physical Activity: Current diet includes well balanced meals, 2 servings of fruit per day, 2 servings of whole grains per day, 2 cups of tea per day and 6-8 WATER  She exercises 5 times per week  Exercise: walking, AROBIC 30 minutes per day  Mood Disorder and Cognitive Impairment Screening: PHQ-9 Depression Scale   Over the past 2 weeks, how often have you been bothered by the following problems? 1 ) Little interest or pleasure in doing things? Not at all    2 ) Feeling down, depressed or hopeless? Not at all    3 ) Trouble falling asleep or sleeping too much? Not at all    4 ) Feeling tired or having little energy? Not at all    5 ) Poor appetite or overeating? Not at all    6 ) Feeling bad about yourself, or that you are a failure, or have let yourself or your family down? Not at all    7 ) Trouble concentrating on things, such as reading a newspaper or watching television? Not at all  How difficult have these problems made it for you to do your work, take care of things at home, or get along with people? Not at all  She denies feeling down, depressed, or hopeless over the past two weeks  She denies feeling little interest or pleasure in doing things over the past two weeks     Cognitive impairment screening: denies difficulty learning/retaining new information, denies difficulty handling complex tasks, denies difficulty with reasoning, denies difficulty with spatial ability and orientation, denies difficulty with language and denies difficulty with behavior  Functional Ability/Level of Safety: Activities of daily living details: does not need help using the phone, no transportation help needed, does not need help shopping, no meal preparation help needed, does not need help doing housework, does not need help doing laundry, does not need help managing medications and does not need help managing money  Fall risk factors: The patient fell 0 times in the past 12 months  Home safety risk factors:  no unfamiliar surroundings, no loose rugs, no poor household lighting, no uneven floors, no household clutter, grab bars in the bathroom and handrails on the stairs  Advance Directives: Advance directives: no living will, no durable power of  for health care directives and no advance directives  Co-Managers and Medical Equipment/Suppliers: See Patient Care Team      Patient Care Team    Care Team Member Role Specialty Office Number   Cinthya Dorothea MCCALLUM  Orthopedic Surgery [phone]   Joe ZAMORA  Family Medicine [phone]     Review of Systems    Constitutional: no fever, no chills, no malaise, no fatigue and no anorexia  Head and Face: no facial pain and no facial pressure  Eyes: no eye pain and no blurred vision  ENT: no earache, no nasal congestion and no sore throat  Cardiovascular: no chest pain, no palpitations and no lower extremity edema  Respiratory: no shortness of breath and no cough  Gastrointestinal: no abdominal pain, no nausea, no vomiting, no diarrhea, no constipation, no bright red blood per rectum and no melena  Genitourinary: no dysuria, no urinary frequency, no urinary urgency and no flank pain  Musculoskeletal: no diffuse joint pain, no generalized muscle aches and no back pain  Integumentary and Breasts: no rashes and no skin lesions  Neurological: no headache, no confusion, no dizziness, no fainting and no paresthesias  Psychiatric: no anxiety and no depression  Endocrine: no hot flashes and no night sweats  Hematologic and Lymphatic: no swollen glands, no tendency for easy bleeding, no tendency for easy bruising and no jaundice  Active Problems    1  Blood pressure elevated without history of HTN (796 2) (R03 0)   2  Encounter for screening mammogram for breast cancer (V76 12) (Z12 31)   3  Medicare welcome exam (V70 0) (Z00 00)   4  Primary osteoarthritis of right knee (715 16) (M17 11)   5  Refused influenza vaccine (V64 06) (Z28 21)   6  Sore throat (462) (J02 9)    Past Medical History    · Acute upper respiratory infection (465 9) (J06 9)   · History of Depression (311) (F32 9)   · History of Fracture Of The Ankle (824 8)   · History of Ganglion Of The Left Wrist (727 42)   · History of fatigue (V13 89) (X78 900)   · History of hyperlipidemia (V12 29) (Z86 39)   · History of X-Ray Wrist Fracture    The active problems and past medical history were reviewed and updated today  Surgical History    · History of Colonoscopy (Fiberoptic)   · History of Hysterectomy   · History of Multiple Ligations Of Internal Hemorrhoids   · History of Neuroplasty Decompression Median Nerve At Carpal Tunnel   · History of Total Abdominal Hysterectomy   · History of Tubal Ligation    The surgical history was reviewed and updated today  Family History  Mother    · Family history of Mother  At Age 66  Father    · Family history of Father  At Age 80  Sister    · Family history of Coronary Artery Disease (V17 49)  Brother    · Family history of Esophageal Cancer (V16 0)   · Family history of Liver Cancer   · Family history of Suicide Completion    The family history was reviewed and updated today         Social History    · Educational Level - Has High School Diploma   · Marital History - Currently    · 3 daughters   · Never A Smoker   · Never Drank Alcohol   · Occupation:   · housewife  The social history was reviewed and updated today  The social history was reviewed and is unchanged  Current Meds   1  Calcium 600-200 MG-UNIT Oral Tablet; Therapy: (EGKLAQEY:92TYH1032) to Recorded   2  Multiple Vitamins Oral Tablet; TAKE 1 TABLET DAILY; Therapy: 77ZVN2256 to (Evaluate:03Ylh2334) Recorded   3  Probiotic CAPS; Therapy: (Recorded:82Txv1207) to Recorded    The medication list was reviewed and updated today  Allergies    1  Sulfa Drugs    Immunizations   1    Influenza  Permanently Deferred: Medical Deferral, PT REFUSED, [id number]    Tdap  12-Jun-2013     Vitals  Signs    Temperature: 97 4 F, Tympanic  Heart Rate: 85  Systolic: 073  Diastolic: 72  Height: 5 ft 4 in  Weight: 149 lb 9 oz  BMI Calculated: 25 67  BSA Calculated: 1 73  O2 Saturation: 98    Physical Exam    Constitutional   General appearance: No acute distress, well appearing and well nourished  Head and Face   Head and face: Normal     Palpation of the face and sinuses: No sinus tenderness  Eyes   Conjunctiva and lids: No swelling, erythema or discharge  Pupils and irises: Equal, round, reactive to light  Ears, Nose, Mouth, and Throat   External inspection of ears and nose: Normal     Otoscopic examination: Tympanic membranes translucent with normal light reflex  Canals patent without erythema  Hearing: Normal     Nasal mucosa, septum, and turbinates: Normal without edema or erythema  Lips, teeth, and gums: Normal, good dentition  Oropharynx: Normal with no erythema, edema, exudate or lesions  Neck   Neck: Supple, symmetric, trachea midline, no masses  Thyroid: Normal, no thyromegaly  Pulmonary   Respiratory effort: No increased work of breathing or signs of respiratory distress  Auscultation of lungs: Clear to auscultation  Cardiovascular   Auscultation of heart: Normal rate and rhythm, normal S1 and S2, no murmurs  Carotid pulses: 2+ bilaterally      Examination of extremities for edema and/or

## 2018-01-13 VITALS
OXYGEN SATURATION: 98 % | TEMPERATURE: 97.4 F | HEIGHT: 64 IN | HEART RATE: 85 BPM | SYSTOLIC BLOOD PRESSURE: 120 MMHG | WEIGHT: 149.56 LBS | BODY MASS INDEX: 25.53 KG/M2 | DIASTOLIC BLOOD PRESSURE: 72 MMHG

## 2018-07-12 ENCOUNTER — TRANSCRIBE ORDERS (OUTPATIENT)
Dept: ADMINISTRATIVE | Age: 68
End: 2018-07-12

## 2018-07-12 ENCOUNTER — HOSPITAL ENCOUNTER (OUTPATIENT)
Dept: RADIOLOGY | Age: 68
Discharge: HOME | End: 2018-07-12

## 2018-07-12 DIAGNOSIS — Z12.31 ENCOUNTER FOR SCREENING MAMMOGRAM FOR MALIGNANT NEOPLASM OF BREAST: ICD-10-CM

## 2018-07-18 DIAGNOSIS — Z12.31 ENCOUNTER FOR SCREENING MAMMOGRAM FOR BREAST CANCER: Primary | ICD-10-CM

## 2018-07-26 ENCOUNTER — HOSPITAL ENCOUNTER (OUTPATIENT)
Dept: RADIOLOGY | Age: 68
Discharge: HOME | End: 2018-07-26
Payer: MEDICARE

## 2018-07-26 ENCOUNTER — TELEPHONE (OUTPATIENT)
Dept: FAMILY MEDICINE CLINIC | Facility: OTHER | Age: 68
End: 2018-07-26

## 2018-07-26 DIAGNOSIS — Z12.31 ENCOUNTER FOR SCREENING MAMMOGRAM FOR BREAST CANCER: ICD-10-CM

## 2018-07-26 PROCEDURE — 77063 BREAST TOMOSYNTHESIS BI: CPT

## 2018-07-26 PROCEDURE — 77067 SCR MAMMO BI INCL CAD: CPT

## 2018-07-26 NOTE — TELEPHONE ENCOUNTER
Patient informed     ----- Message from Isabelle Padilla DO sent at 7/26/2018  5:06 PM EDT -----  Please inform patient that mammogram was unremarkable--repeat 1 year    Isabelle Padilla DO

## 2018-08-18 ENCOUNTER — GENERIC CONVERSION - ENCOUNTER (OUTPATIENT)
Dept: OTHER | Facility: OTHER | Age: 68
End: 2018-08-18

## 2018-08-24 ENCOUNTER — OFFICE VISIT (OUTPATIENT)
Dept: INTERNAL MEDICINE CLINIC | Facility: CLINIC | Age: 68
End: 2018-08-24
Payer: MEDICARE

## 2018-08-24 VITALS
WEIGHT: 161.4 LBS | OXYGEN SATURATION: 97 % | TEMPERATURE: 99.1 F | DIASTOLIC BLOOD PRESSURE: 80 MMHG | HEART RATE: 79 BPM | SYSTOLIC BLOOD PRESSURE: 134 MMHG | BODY MASS INDEX: 27.55 KG/M2 | RESPIRATION RATE: 18 BRPM | HEIGHT: 64 IN

## 2018-08-24 DIAGNOSIS — Z23 NEED FOR PNEUMOCOCCAL VACCINATION: ICD-10-CM

## 2018-08-24 DIAGNOSIS — I83.90 VARICOSE VEIN OF LEG: ICD-10-CM

## 2018-08-24 DIAGNOSIS — Z00.00 HEALTH MAINTENANCE EXAMINATION: ICD-10-CM

## 2018-08-24 DIAGNOSIS — E55.9 VITAMIN D DEFICIENCY: ICD-10-CM

## 2018-08-24 DIAGNOSIS — E78.00 PURE HYPERCHOLESTEROLEMIA: Primary | ICD-10-CM

## 2018-08-24 DIAGNOSIS — M85.89 OSTEOPENIA OF MULTIPLE SITES: ICD-10-CM

## 2018-08-24 PROCEDURE — 90732 PPSV23 VACC 2 YRS+ SUBQ/IM: CPT | Performed by: INTERNAL MEDICINE

## 2018-08-24 PROCEDURE — 99214 OFFICE O/P EST MOD 30 MIN: CPT | Performed by: INTERNAL MEDICINE

## 2018-08-24 PROCEDURE — G0439 PPPS, SUBSEQ VISIT: HCPCS | Performed by: INTERNAL MEDICINE

## 2018-08-24 PROCEDURE — G0009 ADMIN PNEUMOCOCCAL VACCINE: HCPCS | Performed by: INTERNAL MEDICINE

## 2018-08-24 RX ORDER — B-COMPLEX WITH VITAMIN C
TABLET ORAL
COMMUNITY

## 2018-08-24 NOTE — PROGRESS NOTES
Assessment and Plan:    Problem List Items Addressed This Visit        Cardiovascular and Mediastinum    Varicose vein of leg     Reassured  Musculoskeletal and Integument    Osteopenia of multiple sites     On daily calcium and vitamin-D  Other    Pure hypercholesterolemia - Primary     Repeat lipids, not on any medication  Relevant Orders    CBC and differential    Comprehensive metabolic panel    Lipid panel    TSH, 3rd generation with Free T4 reflex    Vitamin D deficiency    Relevant Orders    Vitamin D 25 hydroxy      Other Visit Diagnoses     Health maintenance examination        Updated  She will think about Shingrix  Need for pneumococcal vaccination        Relevant Orders    PNEUMOCOCCAL POLYSACCHARIDE VACCINE 23-VALENT =>1YO SQ IM (Completed)        Health Maintenance Due   Topic Date Due    Fall Risk  11/30/2015    Urinary Incontinence Screening  11/30/2015    Pneumococcal PPSV23/PCV13 65+ Years / Low and Medium Risk (2 of 2 - PPSV23) 08/17/2018         HPI:  Елена Soria is a 79 y o  female here for her Subsequent Wellness Visit  Patient Active Problem List   Diagnosis    Blood pressure elevated without history of HTN    Primary osteoarthritis of right knee    Pure hypercholesterolemia    Osteopenia of multiple sites    Vitamin D deficiency    Varicose vein of leg     Past Medical History:   Diagnosis Date    Ankle fracture, right     Depression     Hyperlipidemia     last assessed 02/11/2016     Past Surgical History:   Procedure Laterality Date    APPENDECTOMY      CARPAL TUNNEL RELEASE Bilateral     COLONOSCOPY      2001-tubular adenoma  08/2008-no polyps   07/2011-polyp, diverticulosis, hemorrhoid, Dr Janett Spear   07/016-diverticulosis, Dr Janett Spear      HEMORRHOID SURGERY      multiple ligations of internal hemorrhoids, last assessed 08/16/2016    HYSTERECTOMY      Fibroids, abdominal hysterectomy    TUBAL LIGATION       Family History   Problem Relation Age of Onset    Stroke Mother 68        CVA    Heart disease Father 79        heart attacks/open heart sx    Diabetes Father 79    Coronary artery disease Sister     Esophageal cancer Brother     Liver cancer Brother     Suicidality Brother         suicide completion    Cancer Brother         Liver/throat (worked in [employer])    Thyroid disease Daughter 36     History   Smoking Status    Never Smoker   Smokeless Tobacco    Never Used     History   Alcohol Use No      History   Drug Use No       Current Outpatient Prescriptions   Medication Sig Dispense Refill    Calcium Carbonate-Vitamin D (CALCIUM 600+D) 600-200 MG-UNIT TABS Take by mouth      Pediatric Multiple Vitamins (CHEWABLE MULTIPLE VITAMINS PO) Take 2 tablets by mouth daily       No current facility-administered medications for this visit  Allergies   Allergen Reactions    Sulfa Antibiotics Rash     Immunization History   Administered Date(s) Administered    Pneumococcal Conjugate 13-Valent 08/17/2017    Pneumococcal Polysaccharide PPV23 08/24/2018    Tdap 06/12/2013       Patient Care Team:  Anurag Ren MD as PCP - General (Internal Medicine)  MD Beltran Quintana MD    Medicare Screening Tests and Risk Assessments:  Dominik Urbano is here for her Subsequent Wellness visit  Last Medicare Wellness visit information reviewed, patient interviewed and updates made to the record today  Health Risk Assessment:  Patient rates overall health as good  Patient feels that their physical health rating is Slightly better  Eyesight was rated as Same  Hearing was rated as Same  Patient feels that their emotional and mental health rating is Same  Pain experienced by patient in the last 7 days has been Some  Patient's pain rating has been 1/10  Emotional/Mental Health:  Patient has been feeling nervous/anxious  PHQ-9 Depression Screening:    Frequency of the following problems over the past two weeks:      1   Little interest or pleasure in doing things: 0 - not at all      2  Feeling down, depressed, or hopeless: 0 - not at all  PHQ-2 Score: 0          Broken Bones/Falls: Fall Risk Assessment:    In the past year, patient has experienced: No history of falling in past year          Bladder/Bowel:  Patient has not leaked urine accidently in the last six months  Patient reports no loss of bowel control  Immunizations:  Patient has not had a flu vaccination within the last year  Patient has received a pneumonia shot  Patient has not received a shingles shot  Home Safety:  Patient does not have trouble with stairs inside or outside of their home  Patient currently reports that there are no safety hazards present in home, working smoke alarms, working carbon monoxide detectors  Preventative Screenings:   Breast cancer screening performed, 8/1/2018  colon cancer screen completed, 7/12/2016  cholesterol screen completed, 1/1/2017  glaucoma eye exam completed, 10/25/2017      Nutrition:  Current diet: Regular, Limited junk food and No Added Salt with servings of the following:    Medications:  Patient is currently taking over-the-counter supplements  List of OTC medications includes: Vitamin C and multivitamin   Patient is able to manage medications  Lifestyle Choices:  Patient reports no tobacco use  Patient has not smoked or used tobacco in the past   Patient reports no alcohol use  Patient drives a vehicle  Patient wears seat belt      Current level of exercise of physical activity described by patient as: 90         Activities of Daily Living:  Can get out of bed by his or her self, able to dress self, able to make own meals, able to do own shopping, able to bathe self, can do own laundry/housekeeping, can manage own money, pay bills and track expenses    Previous Hospitalizations:  No hospitalization or ED visit in past 12 months        Advanced Directives:  Patient has not decided on power of   Patient has not completed advanced directive  Review of Systems   Constitutional: Negative for appetite change and fatigue  HENT: Negative for congestion, ear pain, hearing loss and postnasal drip  Eyes: Negative for visual disturbance  Respiratory: Negative for cough and shortness of breath  Cardiovascular: Negative for chest pain and leg swelling  Gastrointestinal: Negative for abdominal pain, constipation and diarrhea  Genitourinary: Negative for dysuria, frequency and urgency  Musculoskeletal: Negative for arthralgias and myalgias  Skin: Negative for rash and wound  Neurological: Negative for dizziness, numbness and headaches  Hematological: Does not bruise/bleed easily  Psychiatric/Behavioral: Negative for confusion and sleep disturbance  The patient is not nervous/anxious            Objective:      /80   Pulse 79   Temp 99 1 °F (37 3 °C)   Resp 18   Ht 5' 4" (1 626 m)   Wt 73 2 kg (161 lb 6 4 oz)   SpO2 97%   BMI 27 70 kg/m²

## 2018-08-24 NOTE — PROGRESS NOTES
Assessment/Plan:    Pure hypercholesterolemia  Repeat lipids, not on any medication  Osteopenia of multiple sites  On daily calcium and vitamin-D  Varicose vein of leg  Reassured  Primary osteoarthritis of right knee  Currently asymptomatic  Diagnoses and all orders for this visit:    Pure hypercholesterolemia  -     CBC and differential  -     Comprehensive metabolic panel  -     Lipid panel  -     TSH, 3rd generation with Free T4 reflex    Osteopenia of multiple sites    Vitamin D deficiency  -     Vitamin D 25 hydroxy    Health maintenance examination  Comments:  Updated  She will think about Shingrix  Need for pneumococcal vaccination  -     PNEUMOCOCCAL POLYSACCHARIDE VACCINE 23-VALENT =>3YO SQ IM    Varicose vein of leg    Other orders  -     Calcium Carbonate-Vitamin D (CALCIUM 600+D) 600-200 MG-UNIT TABS; Take by mouth  -     Pediatric Multiple Vitamins (CHEWABLE MULTIPLE VITAMINS PO); Take 2 tablets by mouth daily      Follow up in 1 year or as needed  Subjective:      Patient ID: Abby Jackson is a 79 y o  female  Mrs Mabel Silverio feel well, has no complaints  She reports that she has a cyst in her vaginal area, recently saw her gynecologist  It would get a bit bigger sometimes, "feels coarse," but no bleeding or discharge  She also reports that she would get an occasional rash in her inner thigh, bilaterally  It is not due to rubbing of the skin, thinks it is probably due to something she eats  She also complains of varicose veins in her upper left thigh  Area seems more prominent, denies any pain, swelling or redness  She walks daily  She is also concerned about ridges on some of her fingernails  She feels she is lacking certain nutrients  She takes a multi vitamin gummy to twice a day        The following portions of the patient's history were reviewed and updated as appropriate: allergies, current medications, past family history, past medical history, past social history, past surgical history and problem list     Review of Systems   Constitutional: Negative for appetite change and fatigue  HENT: Negative for congestion, ear pain, hearing loss and postnasal drip  Eyes: Negative for visual disturbance  Respiratory: Negative for cough and shortness of breath  Cardiovascular: Negative for chest pain and leg swelling  Gastrointestinal: Negative for abdominal pain, constipation and diarrhea  Genitourinary: Negative for dysuria, frequency and urgency  Musculoskeletal: Negative for arthralgias and myalgias  Skin: Negative for rash and wound  Neurological: Negative for dizziness, numbness and headaches  Hematological: Does not bruise/bleed easily  Psychiatric/Behavioral: Negative for confusion and sleep disturbance  The patient is not nervous/anxious  Objective:      /80   Pulse 79   Temp 99 1 °F (37 3 °C)   Resp 18   Ht 5' 4" (1 626 m)   Wt 73 2 kg (161 lb 6 4 oz)   SpO2 97%   BMI 27 70 kg/m²          Physical Exam   Constitutional: She is oriented to person, place, and time  She appears well-developed and well-nourished  HENT:   Head: Normocephalic and atraumatic  Right Ear: Tympanic membrane, external ear and ear canal normal    Left Ear: Tympanic membrane, external ear and ear canal normal    Nose: Nose normal    Mouth/Throat: Uvula is midline, oropharynx is clear and moist and mucous membranes are normal    Eyes: Conjunctivae are normal  Pupils are equal, round, and reactive to light  Neck: Neck supple  No thyroid mass present  Cardiovascular: Normal rate, regular rhythm and normal heart sounds  No edema  Varicose veins left thigh, medial area  No swelling, tenderness   Pulmonary/Chest: Effort normal and breath sounds normal  She has no wheezes  She has no rhonchi  She has no rales  Abdominal: Soft  Bowel sounds are normal  There is no tenderness  Musculoskeletal: Normal range of motion     No joint pain or swelling   Lymphadenopathy:     She has no cervical adenopathy  Right: No supraclavicular adenopathy present  Left: No supraclavicular adenopathy present  Neurological: She is alert and oriented to person, place, and time  No cranial nerve deficit  Skin: Skin is warm  No rash noted  Psychiatric: She has a normal mood and affect  Her behavior is normal    Nursing note and vitals reviewed  Reviewed available records

## 2018-08-24 NOTE — PATIENT INSTRUCTIONS
You can try taking vitamin B supplements (vitamin B1, B6 and B12) daily  Elevate your legs at the end of each day

## 2018-08-27 ENCOUNTER — TELEPHONE (OUTPATIENT)
Dept: INTERNAL MEDICINE CLINIC | Facility: CLINIC | Age: 68
End: 2018-08-27

## 2018-08-27 ENCOUNTER — APPOINTMENT (OUTPATIENT)
Dept: LAB | Facility: CLINIC | Age: 68
End: 2018-08-27
Payer: MEDICARE

## 2018-08-27 ENCOUNTER — TRANSCRIBE ORDERS (OUTPATIENT)
Dept: LAB | Facility: CLINIC | Age: 68
End: 2018-08-27

## 2018-08-27 LAB
25(OH)D3 SERPL-MCNC: 24.2 NG/ML (ref 30–100)
ALBUMIN SERPL BCP-MCNC: 3.3 G/DL (ref 3.5–5)
ALP SERPL-CCNC: 70 U/L (ref 46–116)
ALT SERPL W P-5'-P-CCNC: 37 U/L (ref 12–78)
ANION GAP SERPL CALCULATED.3IONS-SCNC: 8 MMOL/L (ref 4–13)
AST SERPL W P-5'-P-CCNC: 22 U/L (ref 5–45)
BASOPHILS # BLD AUTO: 0.04 THOUSANDS/ΜL (ref 0–0.1)
BASOPHILS NFR BLD AUTO: 1 % (ref 0–1)
BILIRUB SERPL-MCNC: 0.4 MG/DL (ref 0.2–1)
BUN SERPL-MCNC: 14 MG/DL (ref 5–25)
CALCIUM SERPL-MCNC: 8.4 MG/DL (ref 8.3–10.1)
CHLORIDE SERPL-SCNC: 103 MMOL/L (ref 100–108)
CHOLEST SERPL-MCNC: 216 MG/DL (ref 50–200)
CO2 SERPL-SCNC: 28 MMOL/L (ref 21–32)
CREAT SERPL-MCNC: 0.75 MG/DL (ref 0.6–1.3)
EOSINOPHIL # BLD AUTO: 0.11 THOUSAND/ΜL (ref 0–0.61)
EOSINOPHIL NFR BLD AUTO: 2 % (ref 0–6)
ERYTHROCYTE [DISTWIDTH] IN BLOOD BY AUTOMATED COUNT: 12.7 % (ref 11.6–15.1)
GFR SERPL CREATININE-BSD FRML MDRD: 83 ML/MIN/1.73SQ M
GLUCOSE P FAST SERPL-MCNC: 95 MG/DL (ref 65–99)
HCT VFR BLD AUTO: 38 % (ref 34.8–46.1)
HDLC SERPL-MCNC: 61 MG/DL (ref 40–60)
HGB BLD-MCNC: 12.5 G/DL (ref 11.5–15.4)
IMM GRANULOCYTES # BLD AUTO: 0.03 THOUSAND/UL (ref 0–0.2)
IMM GRANULOCYTES NFR BLD AUTO: 1 % (ref 0–2)
LDLC SERPL CALC-MCNC: 138 MG/DL (ref 0–100)
LYMPHOCYTES # BLD AUTO: 1.44 THOUSANDS/ΜL (ref 0.6–4.47)
LYMPHOCYTES NFR BLD AUTO: 26 % (ref 14–44)
MCH RBC QN AUTO: 30.5 PG (ref 26.8–34.3)
MCHC RBC AUTO-ENTMCNC: 32.9 G/DL (ref 31.4–37.4)
MCV RBC AUTO: 93 FL (ref 82–98)
MONOCYTES # BLD AUTO: 0.46 THOUSAND/ΜL (ref 0.17–1.22)
MONOCYTES NFR BLD AUTO: 8 % (ref 4–12)
NEUTROPHILS # BLD AUTO: 3.53 THOUSANDS/ΜL (ref 1.85–7.62)
NEUTS SEG NFR BLD AUTO: 62 % (ref 43–75)
NONHDLC SERPL-MCNC: 155 MG/DL
NRBC BLD AUTO-RTO: 0 /100 WBCS
PLATELET # BLD AUTO: 221 THOUSANDS/UL (ref 149–390)
PMV BLD AUTO: 9.7 FL (ref 8.9–12.7)
POTASSIUM SERPL-SCNC: 4 MMOL/L (ref 3.5–5.3)
PROT SERPL-MCNC: 6.7 G/DL (ref 6.4–8.2)
RBC # BLD AUTO: 4.1 MILLION/UL (ref 3.81–5.12)
SODIUM SERPL-SCNC: 139 MMOL/L (ref 136–145)
TRIGL SERPL-MCNC: 84 MG/DL
TSH SERPL DL<=0.05 MIU/L-ACNC: 2.48 UIU/ML (ref 0.36–3.74)
WBC # BLD AUTO: 5.61 THOUSAND/UL (ref 4.31–10.16)

## 2018-08-27 PROCEDURE — 80061 LIPID PANEL: CPT | Performed by: INTERNAL MEDICINE

## 2018-08-27 PROCEDURE — 85025 COMPLETE CBC W/AUTO DIFF WBC: CPT | Performed by: INTERNAL MEDICINE

## 2018-08-27 PROCEDURE — 80053 COMPREHEN METABOLIC PANEL: CPT | Performed by: INTERNAL MEDICINE

## 2018-08-27 PROCEDURE — 36415 COLL VENOUS BLD VENIPUNCTURE: CPT | Performed by: INTERNAL MEDICINE

## 2018-08-27 PROCEDURE — 82306 VITAMIN D 25 HYDROXY: CPT | Performed by: INTERNAL MEDICINE

## 2018-08-27 PROCEDURE — 84443 ASSAY THYROID STIM HORMONE: CPT | Performed by: INTERNAL MEDICINE

## 2018-08-27 NOTE — TELEPHONE ENCOUNTER
Lab results:  Cholesterol still a bit high, this is about the same as last year  You can try taking 1 to 2 capsules of fish oil daily  Vitamin D is a bit low, take at least 2000 units of D3 daily  Try to eat more protein (nuts, fish, cottage cheese, yogurt)    Rest of labs ok

## 2018-08-28 NOTE — TELEPHONE ENCOUNTER
Ananth Bryant is a 48 y.o. male presenting for Hypertension  . 1. Have you been to the ER, urgent care clinic since your last visit? Hospitalized since your last visit? No    2. Have you seen or consulted any other health care providers outside of the [address] [place] [address] since your last visit? Include any pap smears or colon screening. No    No flowsheet data found. No flowsheet data found. PHQ over the last two weeks 4/30/2018   Little interest or pleasure in doing things Not at all   Feeling down, depressed or hopeless Not at all   Total Score PHQ 2 0       There are no discontinued medications. Left message for patient to call back

## 2019-07-25 ENCOUNTER — TELEPHONE (OUTPATIENT)
Dept: INTERNAL MEDICINE CLINIC | Facility: CLINIC | Age: 69
End: 2019-07-25

## 2019-07-25 DIAGNOSIS — Z12.31 ENCOUNTER FOR SCREENING MAMMOGRAM FOR BREAST CANCER: Primary | ICD-10-CM

## 2019-07-25 NOTE — TELEPHONE ENCOUNTER
Pt  Is going for mammo screening beginning of August  Please put in order  She is going to go to [facility]

## 2019-08-01 ENCOUNTER — TRANSCRIBE ORDERS (OUTPATIENT)
Dept: ADMINISTRATIVE | Age: 69
End: 2019-08-01

## 2019-08-01 ENCOUNTER — HOSPITAL ENCOUNTER (OUTPATIENT)
Dept: RADIOLOGY | Age: 69
Discharge: HOME | End: 2019-08-01
Payer: MEDICARE

## 2019-08-01 VITALS — HEIGHT: 64 IN | WEIGHT: 162 LBS | BODY MASS INDEX: 27.66 KG/M2

## 2019-08-01 DIAGNOSIS — Z12.31 ENCOUNTER FOR SCREENING MAMMOGRAM FOR BREAST CANCER: ICD-10-CM

## 2019-08-01 PROCEDURE — 77063 BREAST TOMOSYNTHESIS BI: CPT

## 2019-08-01 PROCEDURE — 77067 SCR MAMMO BI INCL CAD: CPT

## 2019-08-19 ENCOUNTER — OFFICE VISIT (OUTPATIENT)
Dept: INTERNAL MEDICINE CLINIC | Facility: CLINIC | Age: 69
End: 2019-08-19
Payer: MEDICARE

## 2019-08-19 VITALS
RESPIRATION RATE: 18 BRPM | HEART RATE: 77 BPM | OXYGEN SATURATION: 97 % | TEMPERATURE: 98.3 F | SYSTOLIC BLOOD PRESSURE: 122 MMHG | DIASTOLIC BLOOD PRESSURE: 80 MMHG | BODY MASS INDEX: 27.93 KG/M2 | WEIGHT: 163.6 LBS | HEIGHT: 64 IN

## 2019-08-19 DIAGNOSIS — Z11.59 NEED FOR HEPATITIS C SCREENING TEST: ICD-10-CM

## 2019-08-19 DIAGNOSIS — E55.9 VITAMIN D DEFICIENCY: ICD-10-CM

## 2019-08-19 DIAGNOSIS — M85.89 OSTEOPENIA OF MULTIPLE SITES: ICD-10-CM

## 2019-08-19 DIAGNOSIS — E78.00 PURE HYPERCHOLESTEROLEMIA: Primary | ICD-10-CM

## 2019-08-19 DIAGNOSIS — Z71.9 HEALTH COUNSELING: ICD-10-CM

## 2019-08-19 DIAGNOSIS — L91.8 SKIN TAG: ICD-10-CM

## 2019-08-19 PROCEDURE — 99214 OFFICE O/P EST MOD 30 MIN: CPT | Performed by: INTERNAL MEDICINE

## 2019-08-19 NOTE — PROGRESS NOTES
Assessment/Plan:    Pure hypercholesterolemia  Monitor lipids, not on medication  Osteopenia of multiple sites  Bone density due, on supplements  Vitamin D deficiency  Takes D3 daily  Diagnoses and all orders for this visit:    Pure hypercholesterolemia  -     Comprehensive metabolic panel  -     Lipid panel  -     TSH, 3rd generation with Free T4 reflex    Osteopenia of multiple sites  -     Comprehensive metabolic panel  -     DXA bone density spine hip and pelvis; Future    Vitamin D deficiency  -     Vitamin D 25 hydroxy    Health counseling  Comments:  Declined Shingrix  Eye exam due  Mammogram updated  Need for hepatitis C screening test  -     Hepatitis C antibody; Future    Skin tag  Comments:  She will call her dermatologist for removal       Follow up in 1 year or as needed  Subjective:      Patient ID: Heather Leigh is a 76 y o  female  Mrs Dyan Kwon is feeling well, has no complaints  She has been swimming almost twice a day this summer with the grand children  She would like to have 4 skin tags removed by her left axillary area  She sees a dermatologist as needed  Denies any hearing loss, sees ENT for wax removal       The following portions of the patient's history were reviewed and updated as appropriate: allergies, current medications, past medical history, past social history and problem list     Review of Systems   Constitutional: Negative for appetite change and fatigue  HENT: Negative for ear pain, hearing loss and postnasal drip  Eyes: Negative for visual disturbance  Respiratory: Negative for cough and shortness of breath  Cardiovascular: Negative for chest pain and leg swelling  Gastrointestinal: Negative for abdominal pain, constipation and diarrhea  Genitourinary: Negative for dysuria, frequency and urgency  Musculoskeletal: Negative for arthralgias and myalgias  Skin: Negative for rash and wound     Neurological: Negative for dizziness and headaches  Psychiatric/Behavioral: Negative for sleep disturbance  Objective:      /80   Pulse 77   Temp 98 3 °F (36 8 °C)   Resp 18   Ht 5' 4" (1 626 m)   Wt 74 2 kg (163 lb 9 6 oz)   SpO2 97%   BMI 28 08 kg/m²          Physical Exam   Constitutional: She is oriented to person, place, and time  She appears well-developed and well-nourished  HENT:   Head: Normocephalic and atraumatic  Right Ear: Tympanic membrane, external ear and ear canal normal    Left Ear: Tympanic membrane, external ear and ear canal normal    Nose: Nose normal    Eyes: Pupils are equal, round, and reactive to light  Conjunctivae are normal    Neck: Neck supple  Cardiovascular: Normal rate, regular rhythm and normal heart sounds  Pulmonary/Chest: Effort normal and breath sounds normal  She has no wheezes  She has no rales  Abdominal: Soft  Bowel sounds are normal    Musculoskeletal: She exhibits no edema  Neurological: She is alert and oriented to person, place, and time  Skin: Skin is warm  No rash noted  Psychiatric: She has a normal mood and affect  Her behavior is normal    Nursing note and vitals reviewed  Lab &/or imaging results reviewed with patient  BMI Counseling: Body mass index is 28 08 kg/m²  Discussed the patient's BMI with her  The BMI is above average  BMI counseling and education was provided to the patient  Exercise recommendations include exercising 3-5 times per week

## 2019-08-21 ENCOUNTER — TELEPHONE (OUTPATIENT)
Dept: INTERNAL MEDICINE CLINIC | Facility: CLINIC | Age: 69
End: 2019-08-21

## 2019-08-21 ENCOUNTER — APPOINTMENT (OUTPATIENT)
Dept: LAB | Facility: CLINIC | Age: 69
End: 2019-08-21
Payer: MEDICARE

## 2019-08-21 DIAGNOSIS — Z11.59 NEED FOR HEPATITIS C SCREENING TEST: ICD-10-CM

## 2019-08-21 LAB
25(OH)D3 SERPL-MCNC: 28.7 NG/ML (ref 30–100)
ALBUMIN SERPL BCP-MCNC: 3.4 G/DL (ref 3.5–5)
ALP SERPL-CCNC: 73 U/L (ref 46–116)
ALT SERPL W P-5'-P-CCNC: 18 U/L (ref 12–78)
ANION GAP SERPL CALCULATED.3IONS-SCNC: 7 MMOL/L (ref 4–13)
AST SERPL W P-5'-P-CCNC: 13 U/L (ref 5–45)
BILIRUB SERPL-MCNC: 0.5 MG/DL (ref 0.2–1)
BUN SERPL-MCNC: 17 MG/DL (ref 5–25)
CALCIUM SERPL-MCNC: 8.1 MG/DL (ref 8.3–10.1)
CHLORIDE SERPL-SCNC: 103 MMOL/L (ref 100–108)
CHOLEST SERPL-MCNC: 229 MG/DL (ref 50–200)
CO2 SERPL-SCNC: 29 MMOL/L (ref 21–32)
CREAT SERPL-MCNC: 0.8 MG/DL (ref 0.6–1.3)
GFR SERPL CREATININE-BSD FRML MDRD: 76 ML/MIN/1.73SQ M
GLUCOSE P FAST SERPL-MCNC: 93 MG/DL (ref 65–99)
HCV AB SER QL: NORMAL
HDLC SERPL-MCNC: 47 MG/DL (ref 40–60)
LDLC SERPL CALC-MCNC: 163 MG/DL (ref 0–100)
NONHDLC SERPL-MCNC: 182 MG/DL
POTASSIUM SERPL-SCNC: 4 MMOL/L (ref 3.5–5.3)
PROT SERPL-MCNC: 6.5 G/DL (ref 6.4–8.2)
SODIUM SERPL-SCNC: 139 MMOL/L (ref 136–145)
TRIGL SERPL-MCNC: 93 MG/DL
TSH SERPL DL<=0.05 MIU/L-ACNC: 3.36 UIU/ML (ref 0.36–3.74)

## 2019-08-21 PROCEDURE — 86803 HEPATITIS C AB TEST: CPT

## 2019-08-21 PROCEDURE — 80053 COMPREHEN METABOLIC PANEL: CPT | Performed by: INTERNAL MEDICINE

## 2019-08-21 PROCEDURE — 36415 COLL VENOUS BLD VENIPUNCTURE: CPT

## 2019-08-21 PROCEDURE — 84443 ASSAY THYROID STIM HORMONE: CPT | Performed by: INTERNAL MEDICINE

## 2019-08-21 PROCEDURE — 82306 VITAMIN D 25 HYDROXY: CPT | Performed by: INTERNAL MEDICINE

## 2019-08-21 PROCEDURE — 80061 LIPID PANEL: CPT | Performed by: INTERNAL MEDICINE

## 2019-08-21 NOTE — TELEPHONE ENCOUNTER
Lab results:    Cholesterol slightly higher, start fish oil capsules as discussed  Continue taking vitamin D, add daily calcium or multivitamin    Rest of labs ok

## 2019-08-22 NOTE — TELEPHONE ENCOUNTER
Patient states her Vitamin already has calcium in it, is there something else you want her to take ?

## 2019-10-05 ENCOUNTER — HOSPITAL ENCOUNTER (OUTPATIENT)
Dept: RADIOLOGY | Age: 69
Discharge: HOME | End: 2019-10-05
Payer: MEDICARE

## 2019-10-05 DIAGNOSIS — M85.89 OSTEOPENIA OF MULTIPLE SITES: ICD-10-CM

## 2019-10-05 PROCEDURE — 77080 DXA BONE DENSITY AXIAL: CPT

## 2020-08-03 ENCOUNTER — TELEPHONE (OUTPATIENT)
Dept: INTERNAL MEDICINE CLINIC | Facility: CLINIC | Age: 70
End: 2020-08-03

## 2020-08-03 DIAGNOSIS — Z12.31 ENCOUNTER FOR SCREENING MAMMOGRAM FOR BREAST CANCER: Primary | ICD-10-CM

## 2020-08-03 NOTE — TELEPHONE ENCOUNTER
Can you please order another mammo for pt      She is scheduled for wed but they attached to one that  on

## 2020-08-05 ENCOUNTER — HOSPITAL ENCOUNTER (OUTPATIENT)
Dept: RADIOLOGY | Age: 70
Discharge: HOME | End: 2020-08-05
Payer: MEDICARE

## 2020-08-05 VITALS — WEIGHT: 163 LBS | BODY MASS INDEX: 27.83 KG/M2 | HEIGHT: 64 IN

## 2020-08-05 DIAGNOSIS — Z12.31 ENCOUNTER FOR SCREENING MAMMOGRAM FOR BREAST CANCER: ICD-10-CM

## 2020-08-05 PROCEDURE — 77067 SCR MAMMO BI INCL CAD: CPT

## 2020-08-05 PROCEDURE — 77063 BREAST TOMOSYNTHESIS BI: CPT

## 2020-08-20 ENCOUNTER — OFFICE VISIT (OUTPATIENT)
Dept: INTERNAL MEDICINE CLINIC | Facility: CLINIC | Age: 70
End: 2020-08-20
Payer: MEDICARE

## 2020-08-20 ENCOUNTER — HOSPITAL ENCOUNTER (OUTPATIENT)
Dept: RADIOLOGY | Facility: HOSPITAL | Age: 70
Discharge: HOME | End: 2020-08-20
Payer: MEDICARE

## 2020-08-20 ENCOUNTER — APPOINTMENT (OUTPATIENT)
Dept: LAB | Facility: CLINIC | Age: 70
End: 2020-08-20
Payer: MEDICARE

## 2020-08-20 ENCOUNTER — TRANSCRIBE ORDERS (OUTPATIENT)
Dept: LAB | Facility: CLINIC | Age: 70
End: 2020-08-20

## 2020-08-20 VITALS
SYSTOLIC BLOOD PRESSURE: 130 MMHG | WEIGHT: 164 LBS | DIASTOLIC BLOOD PRESSURE: 76 MMHG | BODY MASS INDEX: 28 KG/M2 | RESPIRATION RATE: 18 BRPM | TEMPERATURE: 97.4 F | HEIGHT: 64 IN | HEART RATE: 80 BPM | OXYGEN SATURATION: 98 %

## 2020-08-20 DIAGNOSIS — M17.11 PRIMARY OSTEOARTHRITIS OF RIGHT KNEE: ICD-10-CM

## 2020-08-20 DIAGNOSIS — M70.61 TROCHANTERIC BURSITIS, RIGHT HIP: ICD-10-CM

## 2020-08-20 DIAGNOSIS — Z00.00 HEALTH MAINTENANCE EXAMINATION: ICD-10-CM

## 2020-08-20 DIAGNOSIS — E78.00 PURE HYPERCHOLESTEROLEMIA: ICD-10-CM

## 2020-08-20 DIAGNOSIS — E55.9 VITAMIN D DEFICIENCY: ICD-10-CM

## 2020-08-20 DIAGNOSIS — M17.11 PRIMARY OSTEOARTHRITIS OF RIGHT KNEE: Primary | ICD-10-CM

## 2020-08-20 LAB
25(OH)D3 SERPL-MCNC: 33.6 NG/ML (ref 30–100)
ALBUMIN SERPL BCP-MCNC: 3.7 G/DL (ref 3.5–5)
ALP SERPL-CCNC: 75 U/L (ref 46–116)
ALT SERPL W P-5'-P-CCNC: 18 U/L (ref 12–78)
ANION GAP SERPL CALCULATED.3IONS-SCNC: 6 MMOL/L (ref 4–13)
AST SERPL W P-5'-P-CCNC: 14 U/L (ref 5–45)
BASOPHILS # BLD AUTO: 0.03 THOUSANDS/ΜL (ref 0–0.1)
BASOPHILS NFR BLD AUTO: 1 % (ref 0–1)
BILIRUB SERPL-MCNC: 0.63 MG/DL (ref 0.2–1)
BUN SERPL-MCNC: 14 MG/DL (ref 5–25)
CALCIUM SERPL-MCNC: 8.8 MG/DL (ref 8.3–10.1)
CHLORIDE SERPL-SCNC: 102 MMOL/L (ref 100–108)
CHOLEST SERPL-MCNC: 244 MG/DL (ref 50–200)
CO2 SERPL-SCNC: 30 MMOL/L (ref 21–32)
CREAT SERPL-MCNC: 0.91 MG/DL (ref 0.6–1.3)
EOSINOPHIL # BLD AUTO: 0.1 THOUSAND/ΜL (ref 0–0.61)
EOSINOPHIL NFR BLD AUTO: 2 % (ref 0–6)
ERYTHROCYTE [DISTWIDTH] IN BLOOD BY AUTOMATED COUNT: 12.8 % (ref 11.6–15.1)
GFR SERPL CREATININE-BSD FRML MDRD: 65 ML/MIN/1.73SQ M
GLUCOSE P FAST SERPL-MCNC: 93 MG/DL (ref 65–99)
HCT VFR BLD AUTO: 41.5 % (ref 34.8–46.1)
HDLC SERPL-MCNC: 48 MG/DL
HGB BLD-MCNC: 13.5 G/DL (ref 11.5–15.4)
IMM GRANULOCYTES # BLD AUTO: 0.03 THOUSAND/UL (ref 0–0.2)
IMM GRANULOCYTES NFR BLD AUTO: 1 % (ref 0–2)
LDLC SERPL CALC-MCNC: 175 MG/DL (ref 0–100)
LYMPHOCYTES # BLD AUTO: 1.43 THOUSANDS/ΜL (ref 0.6–4.47)
LYMPHOCYTES NFR BLD AUTO: 24 % (ref 14–44)
MCH RBC QN AUTO: 29.9 PG (ref 26.8–34.3)
MCHC RBC AUTO-ENTMCNC: 32.5 G/DL (ref 31.4–37.4)
MCV RBC AUTO: 92 FL (ref 82–98)
MONOCYTES # BLD AUTO: 0.47 THOUSAND/ΜL (ref 0.17–1.22)
MONOCYTES NFR BLD AUTO: 8 % (ref 4–12)
NEUTROPHILS # BLD AUTO: 3.91 THOUSANDS/ΜL (ref 1.85–7.62)
NEUTS SEG NFR BLD AUTO: 64 % (ref 43–75)
NONHDLC SERPL-MCNC: 196 MG/DL
NRBC BLD AUTO-RTO: 0 /100 WBCS
PLATELET # BLD AUTO: 240 THOUSANDS/UL (ref 149–390)
PMV BLD AUTO: 9.8 FL (ref 8.9–12.7)
POTASSIUM SERPL-SCNC: 4 MMOL/L (ref 3.5–5.3)
PROT SERPL-MCNC: 7 G/DL (ref 6.4–8.2)
RBC # BLD AUTO: 4.51 MILLION/UL (ref 3.81–5.12)
SODIUM SERPL-SCNC: 138 MMOL/L (ref 136–145)
TRIGL SERPL-MCNC: 107 MG/DL
TSH SERPL DL<=0.05 MIU/L-ACNC: 2.92 UIU/ML (ref 0.36–3.74)
WBC # BLD AUTO: 5.97 THOUSAND/UL (ref 4.31–10.16)

## 2020-08-20 PROCEDURE — 4040F PNEUMOC VAC/ADMIN/RCVD: CPT | Performed by: INTERNAL MEDICINE

## 2020-08-20 PROCEDURE — 36415 COLL VENOUS BLD VENIPUNCTURE: CPT | Performed by: INTERNAL MEDICINE

## 2020-08-20 PROCEDURE — G0438 PPPS, INITIAL VISIT: HCPCS | Performed by: INTERNAL MEDICINE

## 2020-08-20 PROCEDURE — 82306 VITAMIN D 25 HYDROXY: CPT | Performed by: INTERNAL MEDICINE

## 2020-08-20 PROCEDURE — 80053 COMPREHEN METABOLIC PANEL: CPT | Performed by: INTERNAL MEDICINE

## 2020-08-20 PROCEDURE — 84443 ASSAY THYROID STIM HORMONE: CPT | Performed by: INTERNAL MEDICINE

## 2020-08-20 PROCEDURE — 80061 LIPID PANEL: CPT | Performed by: INTERNAL MEDICINE

## 2020-08-20 PROCEDURE — 85025 COMPLETE CBC W/AUTO DIFF WBC: CPT | Performed by: INTERNAL MEDICINE

## 2020-08-20 PROCEDURE — 1036F TOBACCO NON-USER: CPT | Performed by: INTERNAL MEDICINE

## 2020-08-20 PROCEDURE — 1170F FXNL STATUS ASSESSED: CPT | Performed by: INTERNAL MEDICINE

## 2020-08-20 PROCEDURE — 1125F AMNT PAIN NOTED PAIN PRSNT: CPT | Performed by: INTERNAL MEDICINE

## 2020-08-20 PROCEDURE — 1160F RVW MEDS BY RX/DR IN RCRD: CPT | Performed by: INTERNAL MEDICINE

## 2020-08-20 PROCEDURE — 99214 OFFICE O/P EST MOD 30 MIN: CPT | Performed by: INTERNAL MEDICINE

## 2020-08-20 PROCEDURE — 73562 X-RAY EXAM OF KNEE 3: CPT

## 2020-08-20 PROCEDURE — 3008F BODY MASS INDEX DOCD: CPT | Performed by: INTERNAL MEDICINE

## 2020-08-20 NOTE — RESULT ENCOUNTER NOTE
Lab results: Cholesterol worse, may increase fish oil capsules to up to 3 a day  Vitamin D levels improved, take additional D3 1000 units during fall/winter months  The rest of your labs were normal  Keep hydrated

## 2020-08-20 NOTE — PATIENT INSTRUCTIONS
Take ibuprofen 200 to 400 mg 2 to 3x a day for 2 days, with food, if with right hip pain (bursitis)

## 2020-08-20 NOTE — PROGRESS NOTES
Assessment/Plan:    Primary osteoarthritis of right knee  Pain and swelling worse  Check x-ray, discussed Ortho referral     Pure hypercholesterolemia  Lipids due, taking fish oil  Vitamin D deficiency  May need additional D3  Osteopenia of multiple sites  On supplements  Repeat bone density 2021  Diagnoses and all orders for this visit:    Primary osteoarthritis of right knee  -     XR knee 3 vw right non injury; Future    Trochanteric bursitis, right hip  Comments:  Apply ice/heat, NSAIDs prn  Pure hypercholesterolemia  -     CBC and differential  -     Comprehensive metabolic panel  -     Lipid panel  -     TSH, 3rd generation with Free T4 reflex    Vitamin D deficiency  -     Vitamin D 25 hydroxy    Health maintenance examination  Comments:  Updated  She will consider flu vaccine this season  Other orders  -     Omega-3 Fatty Acids (OMEGA-3 FISH OIL PO); Take by mouth      Follow up in 1 year or as needed  Subjective:      Patient ID: Sukhjinder Guan is a 71 y o  female  Mrs Candace Hernandez complains of right knee pain and swelling  She reports symptoms ongoing for several months, worse recently  She is unable to kneel on it when weeding  She reports swelling mostly in the right side, unable to get up if she sits on the floor kneels down  She also complains of pain on her right hip area and on the top of her right leg  She denies any falls or injury  Pain does not occur daily, does not disrupt daily activities or sleep  She applies Biofreeze when pain is severe  She is otherwise feeling well, denies any allergy symptoms, chest pain or shortness of breath  She remains active  The following portions of the patient's history were reviewed and updated as appropriate: allergies, current medications, past medical history, past social history and problem list     Review of Systems   Constitutional: Negative for activity change, appetite change and fatigue     HENT: Negative for congestion, ear pain and postnasal drip  Eyes: Negative for visual disturbance  Respiratory: Negative for cough and shortness of breath  Cardiovascular: Negative for chest pain and leg swelling  Gastrointestinal: Negative for abdominal pain, constipation and diarrhea  Genitourinary: Negative for dysuria and frequency  Musculoskeletal: Positive for arthralgias, gait problem and joint swelling  Negative for myalgias  Skin: Negative for rash and wound  Neurological: Negative for dizziness, weakness and headaches  Psychiatric/Behavioral: Negative for confusion and sleep disturbance  The patient is not nervous/anxious  Objective:      /76   Pulse 80   Temp (!) 97 4 °F (36 3 °C)   Resp 18   Ht 5' 4" (1 626 m)   Wt 74 4 kg (164 lb)   SpO2 98%   BMI 28 15 kg/m²          Physical Exam  Vitals signs and nursing note reviewed  Constitutional:       General: She is not in acute distress  Appearance: She is well-developed  HENT:      Head: Normocephalic and atraumatic  Right Ear: Tympanic membrane, ear canal and external ear normal       Left Ear: Ear canal and external ear normal  There is impacted cerumen  Eyes:      Pupils: Pupils are equal, round, and reactive to light  Cardiovascular:      Rate and Rhythm: Normal rate and regular rhythm  Heart sounds: Normal heart sounds  Pulmonary:      Effort: Pulmonary effort is normal       Breath sounds: Normal breath sounds  No wheezing  Abdominal:      General: Bowel sounds are normal       Palpations: Abdomen is soft  Skin:     General: Skin is warm  Findings: No rash  Neurological:      Mental Status: She is alert and oriented to person, place, and time  Psychiatric:         Behavior: Behavior normal            Labs & imaging results reviewed with patient

## 2020-08-20 NOTE — PROGRESS NOTES
Assessment and Plan:     Problem List Items Addressed This Visit        Musculoskeletal and Integument    Primary osteoarthritis of right knee - Primary     Pain and swelling worse  Check x-ray, discussed Ortho referral          Relevant Orders    XR knee 3 vw right non injury       Other    Pure hypercholesterolemia     Lipids due, taking fish oil  Relevant Orders    CBC and differential    Comprehensive metabolic panel    Lipid panel    TSH, 3rd generation with Free T4 reflex    Vitamin D deficiency     May need additional D3  Relevant Orders    Vitamin D 25 hydroxy      Other Visit Diagnoses     Trochanteric bursitis, right hip        Apply ice/heat, NSAIDs prn  Health maintenance examination        Updated  She will consider flu vaccine this season  BMI Counseling: Body mass index is 28 15 kg/m²  The BMI is above normal  Nutrition recommendations include encouraging healthy choices of fruits and vegetables  Exercise recommendations include exercising 3-5 times per week  Preventive health issues were discussed with patient, and age appropriate screening tests were ordered as noted in patient's After Visit Summary  Personalized health advice and appropriate referrals for health education or preventive services given if needed, as noted in patient's After Visit Summary       History of Present Illness:     Patient presents for Medicare Annual Wellness visit    Patient Care Team:  Stanislaw Light MD as PCP - General (Internal Medicine)  MD Ira Montes MD     Problem List:     Patient Active Problem List   Diagnosis    Primary osteoarthritis of right knee    Pure hypercholesterolemia    Osteopenia of multiple sites    Vitamin D deficiency    Varicose vein of leg      Past Medical and Surgical History:     Past Medical History:   Diagnosis Date    Ankle fracture, right     Depression     Dysphagia     H/O impacted cerumen     Hyperlipidemia     last assessed 02/11/2016    Laryngopharyngeal reflux (LPR)     Muscle tension dysphonia     Throat symptom     tightness     Past Surgical History:   Procedure Laterality Date    APPENDECTOMY      CARPAL TUNNEL RELEASE Bilateral     COLONOSCOPY      2001-tubular adenoma  08/2008-no polyps   07/2011-polyp, diverticulosis, hemorrhoid, Dr Senia Cunningham   07/016-diverticulosis, Dr Senia Cunningham      HEMORRHOID SURGERY      multiple ligations of internal hemorrhoids, last assessed 08/16/2016    HYSTERECTOMY      Fibroids, abdominal hysterectomy age 39    TUBAL LIGATION        Family History:     Family History   Problem Relation Age of Onset    Stroke Mother 68        CVA    Heart disease Father 79        heart attacks/open heart sx    Diabetes Father 79    Coronary artery disease Half-Sister     Esophageal cancer Brother     Liver cancer Brother     Suicidality Brother         suicide completion    Cancer Brother         Liver/throat (worked in [employer])    Thyroid cancer Brother     Thyroid disease Daughter 36    No Known Problems Maternal Grandmother     No Known Problems Maternal Grandfather     No Known Problems Paternal Grandmother     No Known Problems Paternal Grandfather     No Known Problems Daughter     No Known Problems Daughter     Prostate cancer Brother     No Known Problems Maternal Aunt     No Known Problems Maternal Aunt     No Known Problems Maternal Aunt     No Known Problems Maternal Aunt     No Known Problems Paternal Aunt     No Known Problems Paternal Aunt     Alcohol abuse Neg Hx     Substance Abuse Neg Hx     Mental illness Neg Hx     Depression Neg Hx       Social History:        Social History     Socioeconomic History    Marital status: /Civil Union     Spouse name: None    Number of children: 3    Years of education: 15    Highest education level: None   Occupational History    None   Social Needs    Financial resource strain: None    Food insecurity Worry: None     Inability: None    Transportation needs     Medical: None     Non-medical: None   Tobacco Use    Smoking status: Never Smoker    Smokeless tobacco: Never Used   Substance and Sexual Activity    Alcohol use: No    Drug use: No    Sexual activity: None   Lifestyle    Physical activity     Days per week: None     Minutes per session: None    Stress: None   Relationships    Social connections     Talks on phone: None     Gets together: None     Attends Christian service: None     Active member of club or organization: None     Attends meetings of clubs or organizations: None     Relationship status: None    Intimate partner violence     Fear of current or ex partner: None     Emotionally abused: None     Physically abused: None     Forced sexual activity: None   Other Topics Concern    None   Social History Narrative    Housewife    Drinks tea 2-3 daily       Medications and Allergies:     Current Outpatient Medications   Medication Sig Dispense Refill    Calcium Carbonate-Vitamin D (CALCIUM 600+D) 600-200 MG-UNIT TABS Take by mouth      Omega-3 Fatty Acids (OMEGA-3 FISH OIL PO) Take by mouth      Pediatric Multiple Vitamins (CHEWABLE MULTIPLE VITAMINS PO) Take 2 tablets by mouth daily       No current facility-administered medications for this visit  Allergies   Allergen Reactions    Kiwi Extract     Sulfa Antibiotics Rash      Immunizations:     Immunization History   Administered Date(s) Administered    Pneumococcal Conjugate 13-Valent 08/17/2017    Pneumococcal Polysaccharide PPV23 08/24/2018    Tdap 06/12/2013      Health Maintenance:         Topic Date Due    MAMMOGRAM  08/05/2021    Hepatitis C Screening  Completed     There are no preventive care reminders to display for this patient     Medicare Health Risk Assessment:     /76   Pulse 80   Temp (!) 97 4 °F (36 3 °C)   Resp 18   Ht 5' 4" (1 626 m)   Wt 74 4 kg (164 lb)   SpO2 98%   BMI 28 15 kg/m² Scotty Moran is here for her Subsequent Wellness visit  Last Medicare Wellness visit information reviewed, patient interviewed and updates made to the record today  Health Risk Assessment:   Patient rates overall health as good  Patient feels that their physical health rating is same  Eyesight was rated as same  Hearing was rated as same  Patient feels that their emotional and mental health rating is same  Pain experienced in the last 7 days has been none  Patient states that she has experienced no weight loss or gain in last 6 months  Depression Screening:   PHQ-2 Score: 0      Fall Risk Screening: In the past year, patient has experienced: no history of falling in past year      Urinary Incontinence Screening:   Patient has not leaked urine accidently in the last six months  Home Safety:  Patient does not have trouble with stairs inside or outside of their home  Patient has working smoke alarms and has working carbon monoxide detector  Home safety hazards include: none  Nutrition:   Current diet is Regular  Medications:   Patient is currently taking over-the-counter supplements  OTC medications include: see medication list  Patient is able to manage medications  Activities of Daily Living (ADLs)/Instrumental Activities of Daily Living (IADLs):   Walk and transfer into and out of bed and chair?: Yes  Dress and groom yourself?: Yes    Bathe or shower yourself?: Yes    Feed yourself?  Yes  Do your laundry/housekeeping?: Yes  Manage your money, pay your bills and track your expenses?: Yes  Make your own meals?: Yes    Do your own shopping?: Yes    Previous Hospitalizations:   Any hospitalizations or ED visits within the last 12 months?: No      Advance Care Planning:   Living will: No    Durable POA for healthcare: No    Advanced directive: No    End of Life Decisions reviewed with patient: No      Cognitive Screening:   Provider or family/friend/caregiver concerned regarding cognition?: No    PREVENTIVE SCREENINGS      Cardiovascular Screening:    General: History Lipid Disorder    Due for: Lipid Panel      Diabetes Screening:       Due for: Blood Glucose      Colorectal Cancer Screening:     General: Screening Current      Breast Cancer Screening:     General: Screening Current      Cervical Cancer Screening:    General: Screening Not Indicated      Osteoporosis Screening:    General: Screening Current      Abdominal Aortic Aneurysm (AAA) Screening:        General: Screening Not Indicated      Lung Cancer Screening:     General: Screening Not Indicated      Hepatitis C Screening:    General: Screening Current    Other Counseling Topics:   Regular weightbearing exercise and calcium and vitamin D intake         Connee Rinne, MD

## 2020-08-25 NOTE — RESULT ENCOUNTER NOTE
Right knee x-ray showed mild to moderate arthritis  Recommend to see Ortho since symptoms have worsened  If not, please consider formal physical therapy

## 2020-08-26 ENCOUNTER — TELEPHONE (OUTPATIENT)
Dept: INTERNAL MEDICINE CLINIC | Facility: CLINIC | Age: 70
End: 2020-08-26

## 2020-08-26 DIAGNOSIS — M17.11 PRIMARY OSTEOARTHRITIS OF RIGHT KNEE: Primary | ICD-10-CM

## 2020-09-08 ENCOUNTER — OFFICE VISIT (OUTPATIENT)
Dept: OBGYN CLINIC | Facility: CLINIC | Age: 70
End: 2020-09-08
Payer: MEDICARE

## 2020-09-08 VITALS
DIASTOLIC BLOOD PRESSURE: 84 MMHG | SYSTOLIC BLOOD PRESSURE: 188 MMHG | BODY MASS INDEX: 28.51 KG/M2 | HEIGHT: 64 IN | WEIGHT: 167 LBS | TEMPERATURE: 97.9 F | HEART RATE: 94 BPM

## 2020-09-08 DIAGNOSIS — M17.11 PRIMARY OSTEOARTHRITIS OF RIGHT KNEE: ICD-10-CM

## 2020-09-08 PROCEDURE — 99203 OFFICE O/P NEW LOW 30 MIN: CPT | Performed by: ORTHOPAEDIC SURGERY

## 2020-09-08 NOTE — PROGRESS NOTES
Patient Name:  Unique Langley  MRN:  [id number]    Assessment & Plan     Right knee mild DJD  1  Discussed corticosteroid injection or prescription for meloxicam, but patient feels that symptoms are tolerable currently  2  Activity as tolerated, modify as needed  3  OTC medication as needed  4  Follow-up if pain increases  Chief Complaint     Right Knee Pain    History of the Present Illness     Unique Langley is a 71 y o  female presents today for an orthopedic surgery consultation visit at the request of Dr Makayla Singletary for right knee pain  Patient reports that she has been having intermittent mild pain/swelling about the knee for the past several months  She states that the pain/swelling is not on a daily basis and mild in nature  She localizes her symptoms about the anteromedial aspect of her knee  She believes that she aggravated her arthritis from weeding several times over the summer  She applies Biofreeze when symptoms/pain increase with appreciable benefit  She states that when her symptoms are present they are tolerable with current treatments  Denies numbness and tingling, fevers or chills  Physical Exam     BP (!) 188/84   Pulse 94   Temp 97 9 °F (36 6 °C)   Ht 5' 4" (1 626 m)   Wt 75 8 kg (167 lb)   BMI 28 67 kg/m²     Right knee:  Effusion:  None  Tenderness:  None  Range of motion:  Extension:  0  Flexion:  120  Lachman test:  Stable  Valgus stress:  Stable  Varus stress:  Stable  Posterior drawer test:  Stable  Rodrigo's test:  Negative    Eyes:  Anicteric sclerae  Neck:  Supple  Lungs:  Normal respiratory effort  Cardiovascular:  Capillary refill is less than 2 seconds  Skin:  Intact without erythema  Neurologic:  Sensation grossly intact to light touch  Psychiatric:  Mood and affect are appropriate      Data Review     I have personally reviewed pertinent films in PACS, and my interpretation follows:    X Ray Right Knee performed on 8/20/2020: Mild medial compartment osteoarthritis  No other acute osseous abnormality    Past Medical History:   Diagnosis Date    Ankle fracture, right     Depression     Dysphagia     H/O impacted cerumen     Hyperlipidemia     last assessed 02/11/2016    Laryngopharyngeal reflux (LPR)     Muscle tension dysphonia     Throat symptom     tightness       Past Surgical History:   Procedure Laterality Date    APPENDECTOMY      CARPAL TUNNEL RELEASE Bilateral     COLONOSCOPY      2001-tubular adenoma  08/2008-no polyps   07/2011-polyp, diverticulosis, hemorrhoid, Dr Ephraim Borges   07/016-diverticulosis, Dr Ephraim Borges   HEMORRHOID SURGERY      multiple ligations of internal hemorrhoids, last assessed 08/16/2016    HYSTERECTOMY      Fibroids, abdominal hysterectomy age 39    TUBAL LIGATION         Allergies   Allergen Reactions    Kiwi Extract     Sulfa Antibiotics Rash       Current Outpatient Medications on File Prior to Visit   Medication Sig Dispense Refill    Calcium Carbonate-Vitamin D (CALCIUM 600+D) 600-200 MG-UNIT TABS Take by mouth      Omega-3 Fatty Acids (OMEGA-3 FISH OIL PO) Take by mouth      Pediatric Multiple Vitamins (CHEWABLE MULTIPLE VITAMINS PO) Take 2 tablets by mouth daily       No current facility-administered medications on file prior to visit          Social History     Tobacco Use    Smoking status: Never Smoker    Smokeless tobacco: Never Used   Substance Use Topics    Alcohol use: No    Drug use: No       Family History   Problem Relation Age of Onset    Stroke Mother 68        CVA    Heart disease Father 79        heart attacks/open heart sx    Diabetes Father 79    Coronary artery disease Half-Sister     Esophageal cancer Brother     Liver cancer Brother     Suicidality Brother         suicide completion    Cancer Brother         Liver/throat (worked in [employer])    Thyroid cancer Brother     Thyroid disease Daughter 36    No Known Problems Maternal Grandmother     No Known Problems Maternal Grandfather     No Known Problems Paternal Grandmother     No Known Problems Paternal Grandfather     No Known Problems Daughter     No Known Problems Daughter     Prostate cancer Brother     No Known Problems Maternal Aunt     No Known Problems Maternal Aunt     No Known Problems Maternal Aunt     No Known Problems Maternal Aunt     No Known Problems Paternal Aunt     No Known Problems Paternal Aunt     Alcohol abuse Neg Hx     Substance Abuse Neg Hx     Mental illness Neg Hx     Depression Neg Hx        Review of Systems     As stated in the HPI  All other systems were reviewed and are negative        Scribe Attestation    I,:   Surekha Garcia am acting as a scribe while in the presence of the attending physician :        I,:   Tisha Ramirez MD personally performed the services described in this documentation    as scribed in my presence :

## 2021-02-13 DIAGNOSIS — Z23 ENCOUNTER FOR IMMUNIZATION: ICD-10-CM

## 2021-02-20 ENCOUNTER — IMMUNIZATIONS (OUTPATIENT)
Dept: FAMILY MEDICINE CLINIC | Facility: HOSPITAL | Age: 71
End: 2021-02-20

## 2021-02-20 DIAGNOSIS — Z23 ENCOUNTER FOR IMMUNIZATION: Primary | ICD-10-CM

## 2021-02-20 PROCEDURE — 0001A SARS-COV-2 / COVID-19 MRNA VACCINE (PFIZER-BIONTECH) 30 MCG: CPT

## 2021-02-20 PROCEDURE — 91300 SARS-COV-2 / COVID-19 MRNA VACCINE (PFIZER-BIONTECH) 30 MCG: CPT

## 2021-03-13 ENCOUNTER — IMMUNIZATIONS (OUTPATIENT)
Dept: FAMILY MEDICINE CLINIC | Facility: HOSPITAL | Age: 71
End: 2021-03-13

## 2021-03-13 DIAGNOSIS — Z23 ENCOUNTER FOR IMMUNIZATION: Primary | ICD-10-CM

## 2021-03-13 PROCEDURE — 91300 SARS-COV-2 / COVID-19 MRNA VACCINE (PFIZER-BIONTECH) 30 MCG: CPT

## 2021-03-13 PROCEDURE — 0002A SARS-COV-2 / COVID-19 MRNA VACCINE (PFIZER-BIONTECH) 30 MCG: CPT

## 2021-08-18 ENCOUNTER — HOSPITAL ENCOUNTER (OUTPATIENT)
Dept: RADIOLOGY | Age: 71
Discharge: HOME | End: 2021-08-18
Payer: MEDICARE

## 2021-08-18 VITALS — WEIGHT: 156 LBS | HEIGHT: 64 IN | BODY MASS INDEX: 26.63 KG/M2

## 2021-08-18 DIAGNOSIS — Z12.31 ENCOUNTER FOR SCREENING MAMMOGRAM FOR MALIGNANT NEOPLASM OF BREAST: ICD-10-CM

## 2021-08-18 PROCEDURE — 77063 BREAST TOMOSYNTHESIS BI: CPT

## 2021-08-18 PROCEDURE — 77067 SCR MAMMO BI INCL CAD: CPT

## 2021-08-20 ENCOUNTER — APPOINTMENT (OUTPATIENT)
Dept: LAB | Facility: CLINIC | Age: 71
End: 2021-08-20
Payer: MEDICARE

## 2021-08-20 ENCOUNTER — OFFICE VISIT (OUTPATIENT)
Dept: INTERNAL MEDICINE CLINIC | Facility: CLINIC | Age: 71
End: 2021-08-20
Payer: MEDICARE

## 2021-08-20 VITALS
SYSTOLIC BLOOD PRESSURE: 118 MMHG | DIASTOLIC BLOOD PRESSURE: 74 MMHG | OXYGEN SATURATION: 98 % | TEMPERATURE: 97.3 F | HEIGHT: 64 IN | WEIGHT: 156.6 LBS | HEART RATE: 70 BPM | BODY MASS INDEX: 26.73 KG/M2

## 2021-08-20 DIAGNOSIS — E78.00 PURE HYPERCHOLESTEROLEMIA: Primary | ICD-10-CM

## 2021-08-20 DIAGNOSIS — E66.3 OVERWEIGHT (BMI 25.0-29.9): ICD-10-CM

## 2021-08-20 DIAGNOSIS — E55.9 VITAMIN D DEFICIENCY: ICD-10-CM

## 2021-08-20 DIAGNOSIS — M85.89 OSTEOPENIA OF MULTIPLE SITES: ICD-10-CM

## 2021-08-20 DIAGNOSIS — H61.23 IMPACTED CERUMEN, BILATERAL: ICD-10-CM

## 2021-08-20 PROBLEM — G25.0 BENIGN HEAD TREMOR: Status: ACTIVE | Noted: 2021-08-20

## 2021-08-20 LAB
25(OH)D3 SERPL-MCNC: 31.9 NG/ML (ref 30–100)
ALBUMIN SERPL BCP-MCNC: 3.6 G/DL (ref 3.5–5)
ALP SERPL-CCNC: 75 U/L (ref 46–116)
ALT SERPL W P-5'-P-CCNC: 16 U/L (ref 12–78)
ANION GAP SERPL CALCULATED.3IONS-SCNC: 5 MMOL/L (ref 4–13)
AST SERPL W P-5'-P-CCNC: 12 U/L (ref 5–45)
BASOPHILS # BLD AUTO: 0.03 THOUSANDS/ΜL (ref 0–0.1)
BASOPHILS NFR BLD AUTO: 1 % (ref 0–1)
BILIRUB SERPL-MCNC: 0.61 MG/DL (ref 0.2–1)
BUN SERPL-MCNC: 16 MG/DL (ref 5–25)
CALCIUM SERPL-MCNC: 8.6 MG/DL (ref 8.3–10.1)
CHLORIDE SERPL-SCNC: 103 MMOL/L (ref 100–108)
CHOLEST SERPL-MCNC: 225 MG/DL (ref 50–200)
CO2 SERPL-SCNC: 29 MMOL/L (ref 21–32)
CREAT SERPL-MCNC: 0.79 MG/DL (ref 0.6–1.3)
EOSINOPHIL # BLD AUTO: 0.07 THOUSAND/ΜL (ref 0–0.61)
EOSINOPHIL NFR BLD AUTO: 1 % (ref 0–6)
ERYTHROCYTE [DISTWIDTH] IN BLOOD BY AUTOMATED COUNT: 12.9 % (ref 11.6–15.1)
GFR SERPL CREATININE-BSD FRML MDRD: 76 ML/MIN/1.73SQ M
GLUCOSE P FAST SERPL-MCNC: 99 MG/DL (ref 65–99)
HCT VFR BLD AUTO: 39.6 % (ref 34.8–46.1)
HDLC SERPL-MCNC: 58 MG/DL
HGB BLD-MCNC: 13 G/DL (ref 11.5–15.4)
IMM GRANULOCYTES # BLD AUTO: 0.02 THOUSAND/UL (ref 0–0.2)
IMM GRANULOCYTES NFR BLD AUTO: 0 % (ref 0–2)
LDLC SERPL CALC-MCNC: 147 MG/DL (ref 0–100)
LYMPHOCYTES # BLD AUTO: 1.51 THOUSANDS/ΜL (ref 0.6–4.47)
LYMPHOCYTES NFR BLD AUTO: 29 % (ref 14–44)
MCH RBC QN AUTO: 30.4 PG (ref 26.8–34.3)
MCHC RBC AUTO-ENTMCNC: 32.8 G/DL (ref 31.4–37.4)
MCV RBC AUTO: 93 FL (ref 82–98)
MONOCYTES # BLD AUTO: 0.4 THOUSAND/ΜL (ref 0.17–1.22)
MONOCYTES NFR BLD AUTO: 8 % (ref 4–12)
NEUTROPHILS # BLD AUTO: 3.26 THOUSANDS/ΜL (ref 1.85–7.62)
NEUTS SEG NFR BLD AUTO: 61 % (ref 43–75)
NONHDLC SERPL-MCNC: 167 MG/DL
NRBC BLD AUTO-RTO: 0 /100 WBCS
PLATELET # BLD AUTO: 219 THOUSANDS/UL (ref 149–390)
PMV BLD AUTO: 9.8 FL (ref 8.9–12.7)
POTASSIUM SERPL-SCNC: 4 MMOL/L (ref 3.5–5.3)
PROT SERPL-MCNC: 6.7 G/DL (ref 6.4–8.2)
RBC # BLD AUTO: 4.28 MILLION/UL (ref 3.81–5.12)
SODIUM SERPL-SCNC: 137 MMOL/L (ref 136–145)
TRIGL SERPL-MCNC: 99 MG/DL
TSH SERPL DL<=0.05 MIU/L-ACNC: 2.84 UIU/ML (ref 0.36–3.74)
WBC # BLD AUTO: 5.29 THOUSAND/UL (ref 4.31–10.16)

## 2021-08-20 PROCEDURE — 85025 COMPLETE CBC W/AUTO DIFF WBC: CPT | Performed by: INTERNAL MEDICINE

## 2021-08-20 PROCEDURE — 80061 LIPID PANEL: CPT | Performed by: INTERNAL MEDICINE

## 2021-08-20 PROCEDURE — 84443 ASSAY THYROID STIM HORMONE: CPT | Performed by: INTERNAL MEDICINE

## 2021-08-20 PROCEDURE — 99214 OFFICE O/P EST MOD 30 MIN: CPT | Performed by: INTERNAL MEDICINE

## 2021-08-20 PROCEDURE — 36415 COLL VENOUS BLD VENIPUNCTURE: CPT | Performed by: INTERNAL MEDICINE

## 2021-08-20 PROCEDURE — 82306 VITAMIN D 25 HYDROXY: CPT | Performed by: INTERNAL MEDICINE

## 2021-08-20 PROCEDURE — 80053 COMPREHEN METABOLIC PANEL: CPT | Performed by: INTERNAL MEDICINE

## 2021-08-20 NOTE — PROGRESS NOTES
Assessment/Plan:    Pure hypercholesterolemia  Repeat lipids, dietary changes since last visit  Taking omega-3  Osteopenia of multiple sites  Continue daily walks and supplements  Bone density due  Vitamin D deficiency  Continue daily D3  Overweight (BMI 25 0-29  9)  Lost 8 lbs since last year, maintaining current weight  Recommend strengthening, core and toning exercises  Impacted cerumen, bilateral  Instructed to use peroxide gtts daily, sees ENT prn  Primary osteoarthritis of right knee  Stable, saw Ortho last year, declined steroid injection  Diagnoses and all orders for this visit:    Pure hypercholesterolemia  -     Comprehensive metabolic panel  -     Lipid panel  -     TSH, 3rd generation with Free T4 reflex    Osteopenia of multiple sites  -     CBC and differential  -     Comprehensive metabolic panel  -     DXA bone density spine hip and pelvis; Future    Vitamin D deficiency  -     Vitamin D 25 hydroxy    Overweight (BMI 25 0-29  9)    Impacted cerumen, bilateral      Follow up in 1 year or as needed  Subjective:      Patient ID: Catia Ruiz is a 79 y o  female here for her yearly follow up  She feels well, has no complaints  She started eating healthier and better, has lost some weight  She walks daily  She has maintained her current weight for the past 6 months  She is curious to see if this improved her cholesterol levels  She reports that she developed a head tremor after taking Paxil about 20 years ago  She stop the medication, the tremor still occurs occasionally, she notices it especially when she is sitting still  This is the reason why she does not take any prescription medication  The following portions of the patient's history were reviewed and updated as appropriate: allergies, current medications, past medical history, past social history and problem list     Review of Systems   Constitutional: Negative for appetite change and fatigue     HENT: Negative for congestion, ear pain and postnasal drip  Eyes: Negative for visual disturbance  Respiratory: Negative for cough and shortness of breath  Cardiovascular: Negative for chest pain and leg swelling  Gastrointestinal: Negative for abdominal pain, constipation and diarrhea  Genitourinary: Negative for dysuria, frequency and urgency  Musculoskeletal: Negative for arthralgias and myalgias  Skin: Negative for rash and wound  Neurological: Negative for dizziness, numbness and headaches  Hematological: Does not bruise/bleed easily  Psychiatric/Behavioral: Negative for confusion  The patient is not nervous/anxious  Objective:      /74   Pulse 70   Temp (!) 97 3 °F (36 3 °C)   Ht 5' 4" (1 626 m)   Wt 71 kg (156 lb 9 6 oz)   SpO2 98%   BMI 26 88 kg/m²          Physical Exam  Vitals and nursing note reviewed  Constitutional:       General: She is not in acute distress  Appearance: She is well-developed  HENT:      Head: Normocephalic and atraumatic  Right Ear: There is impacted cerumen  Left Ear: There is impacted cerumen  Eyes:      Pupils: Pupils are equal, round, and reactive to light  Cardiovascular:      Rate and Rhythm: Normal rate and regular rhythm  Heart sounds: Normal heart sounds  Pulmonary:      Effort: Pulmonary effort is normal       Breath sounds: Normal breath sounds  No wheezing  Abdominal:      General: Bowel sounds are normal       Palpations: Abdomen is soft  Musculoskeletal:         General: No swelling  Skin:     General: Skin is warm  Findings: No rash  Neurological:      General: No focal deficit present  Mental Status: She is alert and oriented to person, place, and time  Psychiatric:         Mood and Affect: Mood normal          Behavior: Behavior normal            BMI Counseling: Body mass index is 26 88 kg/m²  The BMI is above normal  Exercise recommendations include strength training exercises  Labs & imaging results reviewed with patient

## 2021-08-20 NOTE — RESULT ENCOUNTER NOTE
Lab results: cholesterol has improved, still elevated  You can take up to 3 fish oil capsules a day    The rest of your labs were normal

## 2021-08-20 NOTE — PATIENT INSTRUCTIONS
Core Strengthening Exercises   AMBULATORY CARE:   What you need to know about core strengthening exercises: Your core includes the muscles of your lower back, hip, pelvis, and abdomen  Core strengthening exercises help heal and strengthen these muscles  This helps prevent another injury, and keeps your pelvis, spine, and hips in the correct position  Contact your healthcare provider if:   · You have sharp or worsening pain during exercise or at rest     · You have questions or concerns about your shoulder exercises  Safety tips:  Talk to your healthcare provider before you start an exercise program  A physical therapist can teach you how to do core strengthening exercises safely  · Do the exercises on a mat or firm surface  A firm surface will support your spine and prevent low back pain  Do not do these exercises on a bed  · Move slowly and smoothly  Avoid fast or jerky motions  · Stop if you feel pain  Core exercises should not be painful  Stop if you feel pain  · Breathe normally during core exercises  Do not hold your breath  This may cause an increase in blood pressure and prevent muscle strengthening  Your healthcare provider will tell you when to inhale and exhale during the exercise  · Begin all of your exercises with abdominal bracing  Abdominal bracing helps warm up your core muscles  You can also practice abdominal bracing throughout the day  Lie on your back with your knees bent and feet flat on the floor  Place your arms in a relaxed position beside your body  Tighten your abdominal muscles  Pull your belly button in and up toward your spine  Hold for 5 seconds  Relax your muscles  Repeat 10 times  Core strengthening exercises: Your healthcare provider will tell you how often to do these exercises  The provider will also tell you how many repetitions of each exercise you should do  Hold each exercise for 5 seconds or as directed   As you get stronger, increase your hold to 10 to 15 seconds  You can do some of these exercises on a stability ball, or with a weight  Ask your healthcare provider how to use a stability ball or weight for these exercises:  · Bridging:  Lie on your back with your knees bent and feet flat on the floor  Rest your arms at your side  Tighten your buttocks, and then lift your hips 1 inch off the floor  Hold for 5 seconds  When you can do this exercise without pain for 10 seconds, increase the distance you lift your hips  A good goal is to be able to lift your hips so that your shoulders, hips, and knees are in a straight line  · Dead bug:  Lie on your back with your knees bent and feet flat on the floor  Place your arms in a relaxed position beside your body  Begin with abdominal bracing  Next, raise one leg, keeping your knee bent  Hold for 5 seconds  Repeat with the other leg  When you can do this exercise without pain for 10 to 15 seconds, you may raise one straight leg and hold  Repeat with the other leg  · Quadruped:  Place your hands and knees on the floor  Keep your wrists directly below your shoulders and your knees directly below your hips  Pull your belly button in toward your spine  Do not flatten or arch your back  Tighten your abdominal muscles below your belly button  Hold for 5 seconds  When you can do this exercise without pain for 10 to 15 seconds, you may extend one arm and hold  Repeat on the other side  · Side bridge exercises:      ? Standing side bridge:  Stand next to a wall and extend one arm toward the wall  Place your palm flat on the wall with your fingers pointing upward  Begin with abdominal bracing  Next, without moving your feet, slowly bend your arm to 90 degrees  Hold for 5 seconds  Repeat on the other side  When you can do this exercise without pain for 10 to 15 seconds, you may do the bent leg side bridge on the floor  ?  Bent leg side bridge:  Lie on one side with your legs, hips, and shoulders in a straight line  Prop yourself up onto your forearm so your elbow is directly below your shoulder  Bend your knees back to 90 degrees  Begin with abdominal bracing  Next, lift your hips and balance yourself on your forearm and knees  Hold for 5 seconds  Repeat on the other side  When you can do this exercise without pain for 10 to 15 seconds, you may do the straight leg side bridge on the floor  ? Straight leg side bridge:  Lie on one side with your legs, hips, and shoulders in a straight line  Prop yourself up onto your forearm so your elbow is directly below your shoulder  Begin with abdominal bracing  Lift your hips off the floor and balance yourself on your forearm and the outside of your flexed foot  Do not let your ankle bend sideways  Hold for 5 seconds  Repeat on the other side  When you can do this exercise without pain for 10 to 15 seconds, ask your healthcare provider for more advanced exercises  · Superman:  Lie on your stomach  Extend your arms forward on the floor  Tighten your abdominal muscles and lift your right hand and left leg off the floor  Hold this position  Slowly return to the starting position  Tighten your abdominal muscles and lift your left hand and right leg off the floor  Hold this position  Slowly return to the starting position  · Clam:  Lie on your side with your knees bent  Put your bottom arm under your head to keep your neck in line  Put your top hand on your hip to keep your pelvis from moving  Put your heels together, and keep them together during this exercise  Slowly raise your top knee toward the ceiling  Then lower your leg so your knees are together  Repeat this exercise 10 times  Then switch sides and do the exercise 10 times with the other leg  · Curl up:  Lie on your back with your knees bent and feet flat on the floor  Place your hands, palms down, underneath your lower back   Next, with your elbows on the floor, lift your shoulders and chest 2 to 3 inches off the floor  Keep your head in line with your shoulders  Hold this position  Slowly return to the starting position  · Straight leg raises:  Lie on your back with one leg straight  Bend the other knee and place your foot flat on the floor  Tighten your abdominal muscles  Keep your leg straight and slowly lift it straight up 6 to 12 inches off the floor  Hold this position  Lower your leg slowly  Do as many repetitions as directed on this side  Repeat with the other leg  · Plank:  Lie on your stomach  Bend your elbows and place your forearms flat on the floor  Lift your chest, stomach, and knees off the floor  Make sure your elbows are below your shoulders  Your body should be in a straight line  Do not let your hips or lower back sink to the ground  Squeeze your abdominal muscles together and hold for 15 seconds  To make this exercise harder, hold for 30 seconds or lift 1 leg at a time  · Bicycles:  Lie on your back  Bend both knees and bring them toward your chest  Your calves should be parallel to the floor  Place the palms of your hands on the back of your head  Straighten your right leg and keep it lifted 2 inches off the floor  Raise your head and shoulders off the floor and twist towards your left  Keep your head and shoulders lifted  Bend your right knee while you straighten your left leg  Keep your left leg 2 inches off the floor  Twist your head and chest towards the left leg  Continue to straighten 1 leg at a time and twist        Follow up with your healthcare provider as directed:  Write down your questions so you remember to ask them during your visits  © Copyright [employer] 2021 Information is for End User's use only and may not be sold, redistributed or otherwise used for commercial purposes   All illustrations and images included in CareNotes® are the copyrighted property of A D A M , Inc  or [facility] [place] [employer]   The above information is an  only  It is not intended as medical advice for individual conditions or treatments  Talk to your doctor, nurse or pharmacist before following any medical regimen to see if it is safe and effective for you

## 2021-08-20 NOTE — ASSESSMENT & PLAN NOTE
10% - bad control"> 10% - bad control,Hemoglobin A1c (HbA1c) greater than 10% indicating poor diabetic control,Haemoglobin A1c greater than 10% indicating poor diabetic control">   Diabetes Mellitus Type 2 in Adults, Ambulatory Care   GENERAL INFORMATION:   Diabetes mellitus type 2  is a disease that affects how your body uses glucose (sugar)  Insulin helps move sugar out of the blood so it can be used for energy  Normally, when the blood sugar level increases, the pancreas makes more insulin  Type 2 diabetes develops because either the body cannot make enough insulin, or it cannot use the insulin correctly  After many years, your pancreas may stop making insulin  Common symptoms include the following:   · More hunger or thirst than usual     · Frequent urination     · Weight loss without trying     · Blurred vision  Seek immediate care for the following symptoms:   · Severe abdominal pain, or pain that spreads to your back  You may also be vomiting  · Trouble staying awake or focusing    · Shaking or sweating    · Blurred or double vision    · Breath has a fruity, sweet smell    · Breathing is deep and labored, or rapid and shallow    · Heartbeat is fast and weak  Treatment for diabetes mellitus type 2  includes keeping your blood sugar at a normal level  You must eat the right foods, and exercise regularly  You may also need medicine if you cannot control your blood sugar level with nutrition and exercise  Manage diabetes mellitus type 2:   · Check your blood sugar level  You will be taught how to check a small drop of blood in a glucose monitor  Ask your healthcare provider when and how often to check during the day  Ask your healthcare provider what your blood sugar levels should be when you check them  · Keep track of carbohydrates (sugar and starchy foods)  Your blood sugar level can get too high if you eat too many carbohydrates   Your dietitian will help you plan meals and snacks that have the Lost 8 lbs since last year, maintaining current weight  Recommend strengthening, core and toning exercises  right amount of carbohydrates  · Eat low-fat foods  Some examples are skinless chicken and low-fat milk  · Eat less sodium (salt)  Some examples of high-sodium foods to limit are soy sauce, potato chips, and soup  Do not add salt to food you cook  Limit your use of table salt  · Eat high-fiber foods  Foods that are a good source of fiber include vegetables, whole grain bread, and beans  · Limit alcohol  Alcohol affects your blood sugar level and can make it harder to manage your diabetes  Women should limit alcohol to 1 drink a day  Men should limit alcohol to 2 drinks a day  A drink of alcohol is 12 ounces of beer, 5 ounces of wine, or 1½ ounces of liquor  · Get regular exercise  Exercise can help keep your blood sugar level steady, decrease your risk of heart disease, and help you lose weight  Exercise for at least 30 minutes, 5 days a week  Include muscle strengthening activities 2 days each week  Work with your healthcare provider to create an exercise plan  · Check your feet each day  for injuries or open sores  Ask your healthcare provider for activities you can do if you have an open sore  · Quit smoking  If you smoke, it is never too late to quit  Smoking can worsen the problems that may occur with diabetes  Ask your healthcare provider for information about how to stop smoking if you are having trouble quitting  · Ask about your weight:  Ask healthcare providers if you need to lose weight, and how much to lose  Ask them to help you with a weight loss program  Even a 10 to 15 pound weight loss can help you manage your blood sugar level  · Carry medical alert identification  Wear medical alert jewelry or carry a card that says you have diabetes  Ask your healthcare provider where to get these items  · Ask about vaccines  Diabetes puts you at risk of serious illness if you get the flu, pneumonia, or hepatitis   Ask your healthcare provider if you should get a flu, pneumonia, or hepatitis B vaccine, and when to get the vaccine  Follow up with your healthcare provider as directed:  Write down your questions so you remember to ask them during your visits  CARE AGREEMENT:   You have the right to help plan your care  Learn about your health condition and how it may be treated  Discuss treatment options with your caregivers to decide what care you want to receive  You always have the right to refuse treatment  The above information is an  only  It is not intended as medical advice for individual conditions or treatments  Talk to your doctor, nurse or pharmacist before following any medical regimen to see if it is safe and effective for you  © 2014 [address] is for End User's use only and may not be sold, redistributed or otherwise used for commercial purposes  All illustrations and images included in CareNotes® are the copyrighted property of A D A M , Inc  or Bruno Johnson    The a doctor about her good with will guided free of

## 2021-12-27 ENCOUNTER — HOSPITAL ENCOUNTER (OUTPATIENT)
Dept: RADIOLOGY | Age: 71
Discharge: HOME | End: 2021-12-27
Payer: MEDICARE

## 2021-12-27 DIAGNOSIS — M85.89 OSTEOPENIA OF MULTIPLE SITES: ICD-10-CM

## 2021-12-27 PROCEDURE — 77080 DXA BONE DENSITY AXIAL: CPT

## 2022-06-20 ENCOUNTER — TELEPHONE (OUTPATIENT)
Dept: INTERNAL MEDICINE CLINIC | Facility: CLINIC | Age: 72
End: 2022-06-20

## 2022-06-20 NOTE — TELEPHONE ENCOUNTER
Pt denies rash, wound only itchy around collar bone, no facial droop, no numbness or weakness  Not taking any meds for symptoms

## 2022-06-21 NOTE — TELEPHONE ENCOUNTER
Spoke with patient  She's not concerned  Wanted Dr Bernard Luu aware    Will keep appointment in August

## 2022-07-10 ENCOUNTER — TELEPHONE (OUTPATIENT)
Dept: OTHER | Facility: OTHER | Age: 72
End: 2022-07-10

## 2022-08-24 ENCOUNTER — OFFICE VISIT (OUTPATIENT)
Dept: INTERNAL MEDICINE CLINIC | Facility: CLINIC | Age: 72
End: 2022-08-24
Payer: MEDICARE

## 2022-08-24 ENCOUNTER — APPOINTMENT (OUTPATIENT)
Dept: LAB | Facility: CLINIC | Age: 72
End: 2022-08-24
Payer: MEDICARE

## 2022-08-24 VITALS
TEMPERATURE: 96.7 F | BODY MASS INDEX: 26.98 KG/M2 | HEART RATE: 88 BPM | WEIGHT: 158 LBS | DIASTOLIC BLOOD PRESSURE: 62 MMHG | SYSTOLIC BLOOD PRESSURE: 106 MMHG | RESPIRATION RATE: 16 BRPM | HEIGHT: 64 IN | OXYGEN SATURATION: 98 %

## 2022-08-24 DIAGNOSIS — Z12.31 ENCOUNTER FOR SCREENING MAMMOGRAM FOR BREAST CANCER: ICD-10-CM

## 2022-08-24 DIAGNOSIS — M85.89 OSTEOPENIA OF MULTIPLE SITES: ICD-10-CM

## 2022-08-24 DIAGNOSIS — E55.9 VITAMIN D DEFICIENCY: ICD-10-CM

## 2022-08-24 DIAGNOSIS — G25.0 BENIGN HEAD TREMOR: ICD-10-CM

## 2022-08-24 DIAGNOSIS — H61.23 IMPACTED CERUMEN, BILATERAL: ICD-10-CM

## 2022-08-24 DIAGNOSIS — M17.11 PRIMARY OSTEOARTHRITIS OF RIGHT KNEE: ICD-10-CM

## 2022-08-24 DIAGNOSIS — Z00.00 HEALTH MAINTENANCE EXAMINATION: ICD-10-CM

## 2022-08-24 DIAGNOSIS — E78.00 PURE HYPERCHOLESTEROLEMIA: ICD-10-CM

## 2022-08-24 DIAGNOSIS — E66.3 OVERWEIGHT (BMI 25.0-29.9): ICD-10-CM

## 2022-08-24 DIAGNOSIS — R68.89 LIP SYMPTOM: Primary | ICD-10-CM

## 2022-08-24 LAB
25(OH)D3 SERPL-MCNC: 33.3 NG/ML (ref 30–100)
ALBUMIN SERPL BCP-MCNC: 4.1 G/DL (ref 3.5–5)
ALP SERPL-CCNC: 67 U/L (ref 34–104)
ALT SERPL W P-5'-P-CCNC: 14 U/L (ref 7–52)
ANION GAP SERPL CALCULATED.3IONS-SCNC: 8 MMOL/L (ref 4–13)
AST SERPL W P-5'-P-CCNC: 18 U/L (ref 13–39)
BASOPHILS # BLD AUTO: 0.03 THOUSANDS/ΜL (ref 0–0.1)
BASOPHILS NFR BLD AUTO: 1 % (ref 0–1)
BILIRUB SERPL-MCNC: 0.75 MG/DL (ref 0.2–1)
BUN SERPL-MCNC: 16 MG/DL (ref 5–25)
CALCIUM SERPL-MCNC: 8.9 MG/DL (ref 8.4–10.2)
CHLORIDE SERPL-SCNC: 102 MMOL/L (ref 96–108)
CHOLEST SERPL-MCNC: 260 MG/DL
CO2 SERPL-SCNC: 28 MMOL/L (ref 21–32)
CREAT SERPL-MCNC: 0.75 MG/DL (ref 0.6–1.3)
EOSINOPHIL # BLD AUTO: 0.09 THOUSAND/ΜL (ref 0–0.61)
EOSINOPHIL NFR BLD AUTO: 2 % (ref 0–6)
ERYTHROCYTE [DISTWIDTH] IN BLOOD BY AUTOMATED COUNT: 13 % (ref 11.6–15.1)
GFR SERPL CREATININE-BSD FRML MDRD: 80 ML/MIN/1.73SQ M
GLUCOSE P FAST SERPL-MCNC: 92 MG/DL (ref 65–99)
HCT VFR BLD AUTO: 41.6 % (ref 34.8–46.1)
HDLC SERPL-MCNC: 56 MG/DL
HGB BLD-MCNC: 13.9 G/DL (ref 11.5–15.4)
IMM GRANULOCYTES # BLD AUTO: 0.02 THOUSAND/UL (ref 0–0.2)
IMM GRANULOCYTES NFR BLD AUTO: 0 % (ref 0–2)
LDLC SERPL CALC-MCNC: 179 MG/DL (ref 0–100)
LYMPHOCYTES # BLD AUTO: 1.3 THOUSANDS/ΜL (ref 0.6–4.47)
LYMPHOCYTES NFR BLD AUTO: 22 % (ref 14–44)
MCH RBC QN AUTO: 30.9 PG (ref 26.8–34.3)
MCHC RBC AUTO-ENTMCNC: 33.4 G/DL (ref 31.4–37.4)
MCV RBC AUTO: 92 FL (ref 82–98)
MONOCYTES # BLD AUTO: 0.44 THOUSAND/ΜL (ref 0.17–1.22)
MONOCYTES NFR BLD AUTO: 7 % (ref 4–12)
NEUTROPHILS # BLD AUTO: 4.09 THOUSANDS/ΜL (ref 1.85–7.62)
NEUTS SEG NFR BLD AUTO: 68 % (ref 43–75)
NONHDLC SERPL-MCNC: 204 MG/DL
NRBC BLD AUTO-RTO: 0 /100 WBCS
PLATELET # BLD AUTO: 240 THOUSANDS/UL (ref 149–390)
PMV BLD AUTO: 10.5 FL (ref 8.9–12.7)
POTASSIUM SERPL-SCNC: 4 MMOL/L (ref 3.5–5.3)
PROT SERPL-MCNC: 6.7 G/DL (ref 6.4–8.4)
RBC # BLD AUTO: 4.5 MILLION/UL (ref 3.81–5.12)
SODIUM SERPL-SCNC: 138 MMOL/L (ref 135–147)
TRIGL SERPL-MCNC: 126 MG/DL
TSH SERPL DL<=0.05 MIU/L-ACNC: 2.86 UIU/ML (ref 0.45–4.5)
WBC # BLD AUTO: 5.97 THOUSAND/UL (ref 4.31–10.16)

## 2022-08-24 PROCEDURE — 85025 COMPLETE CBC W/AUTO DIFF WBC: CPT | Performed by: INTERNAL MEDICINE

## 2022-08-24 PROCEDURE — 80061 LIPID PANEL: CPT | Performed by: INTERNAL MEDICINE

## 2022-08-24 PROCEDURE — 84443 ASSAY THYROID STIM HORMONE: CPT | Performed by: INTERNAL MEDICINE

## 2022-08-24 PROCEDURE — 82306 VITAMIN D 25 HYDROXY: CPT | Performed by: INTERNAL MEDICINE

## 2022-08-24 PROCEDURE — 99214 OFFICE O/P EST MOD 30 MIN: CPT | Performed by: INTERNAL MEDICINE

## 2022-08-24 PROCEDURE — 80053 COMPREHEN METABOLIC PANEL: CPT | Performed by: INTERNAL MEDICINE

## 2022-08-24 PROCEDURE — G0439 PPPS, SUBSEQ VISIT: HCPCS | Performed by: INTERNAL MEDICINE

## 2022-08-24 PROCEDURE — 36415 COLL VENOUS BLD VENIPUNCTURE: CPT | Performed by: INTERNAL MEDICINE

## 2022-08-24 NOTE — PATIENT INSTRUCTIONS
Core Strengthening Exercises   AMBULATORY CARE:   What you need to know about core strengthening exercises: Your core includes the muscles of your lower back, hip, pelvis, and abdomen  Core strengthening exercises help heal and strengthen these muscles  This helps prevent another injury, and keeps your pelvis, spine, and hips in the correct position  Contact your healthcare provider if:   You have sharp or worsening pain during exercise or at rest     You have questions or concerns about your shoulder exercises  Safety tips:  Talk to your healthcare provider before you start an exercise program  A physical therapist can teach you how to do core strengthening exercises safely  Do the exercises on a mat or firm surface  A firm surface will support your spine and prevent low back pain  Do not do these exercises on a bed  Move slowly and smoothly  Avoid fast or jerky motions  Stop if you feel pain  Core exercises should not be painful  Stop if you feel pain  Breathe normally during core exercises  Do not hold your breath  This may cause an increase in blood pressure and prevent muscle strengthening  Your healthcare provider will tell you when to inhale and exhale during the exercise  Begin all of your exercises with abdominal bracing  Abdominal bracing helps warm up your core muscles  You can also practice abdominal bracing throughout the day  Lie on your back with your knees bent and feet flat on the floor  Place your arms in a relaxed position beside your body  Tighten your abdominal muscles  Pull your belly button in and up toward your spine  Hold for 5 seconds  Relax your muscles  Repeat 10 times  Core strengthening exercises: Your healthcare provider will tell you how often to do these exercises  The provider will also tell you how many repetitions of each exercise you should do  Hold each exercise for 5 seconds or as directed   As you get stronger, increase your hold to 10 to 15 seconds  You can do some of these exercises on a stability ball, or with a weight  Ask your healthcare provider how to use a stability ball or weight for these exercises:  Bridging:  Lie on your back with your knees bent and feet flat on the floor  Rest your arms at your side  Tighten your buttocks, and then lift your hips 1 inch off the floor  Hold for 5 seconds  When you can do this exercise without pain for 10 seconds, increase the distance you lift your hips  A good goal is to be able to lift your hips so that your shoulders, hips, and knees are in a straight line  Dead bug:  Lie on your back with your knees bent and feet flat on the floor  Place your arms in a relaxed position beside your body  Begin with abdominal bracing  Next, raise one leg, keeping your knee bent  Hold for 5 seconds  Repeat with the other leg  When you can do this exercise without pain for 10 to 15 seconds, you may raise one straight leg and hold  Repeat with the other leg  Quadruped:  Place your hands and knees on the floor  Keep your wrists directly below your shoulders and your knees directly below your hips  Pull your belly button in toward your spine  Do not flatten or arch your back  Tighten your abdominal muscles below your belly button  Hold for 5 seconds  When you can do this exercise without pain for 10 to 15 seconds, you may extend one arm and hold  Repeat on the other side  Side bridge exercises:      Standing side bridge:  Stand next to a wall and extend one arm toward the wall  Place your palm flat on the wall with your fingers pointing upward  Begin with abdominal bracing  Next, without moving your feet, slowly bend your arm to 90 degrees  Hold for 5 seconds  Repeat on the other side  When you can do this exercise without pain for 10 to 15 seconds, you may do the bent leg side bridge on the floor  Bent leg side bridge:  Lie on one side with your legs, hips, and shoulders in a straight line  Prop yourself up onto your forearm so your elbow is directly below your shoulder  Bend your knees back to 90 degrees  Begin with abdominal bracing  Next, lift your hips and balance yourself on your forearm and knees  Hold for 5 seconds  Repeat on the other side  When you can do this exercise without pain for 10 to 15 seconds, you may do the straight leg side bridge on the floor  Straight leg side bridge:  Lie on one side with your legs, hips, and shoulders in a straight line  Prop yourself up onto your forearm so your elbow is directly below your shoulder  Begin with abdominal bracing  Lift your hips off the floor and balance yourself on your forearm and the outside of your flexed foot  Do not let your ankle bend sideways  Hold for 5 seconds  Repeat on the other side  When you can do this exercise without pain for 10 to 15 seconds, ask your healthcare provider for more advanced exercises  Superman:  Lie on your stomach  Extend your arms forward on the floor  Tighten your abdominal muscles and lift your right hand and left leg off the floor  Hold this position  Slowly return to the starting position  Tighten your abdominal muscles and lift your left hand and right leg off the floor  Hold this position  Slowly return to the starting position  Clam:  Lie on your side with your knees bent  Put your bottom arm under your head to keep your neck in line  Put your top hand on your hip to keep your pelvis from moving  Put your heels together, and keep them together during this exercise  Slowly raise your top knee toward the ceiling  Then lower your leg so your knees are together  Repeat this exercise 10 times  Then switch sides and do the exercise 10 times with the other leg  Curl up:  Lie on your back with your knees bent and feet flat on the floor  Place your hands, palms down, underneath your lower back   Next, with your elbows on the floor, lift your shoulders and chest 2 to 3 inches off the floor  Keep your head in line with your shoulders  Hold this position  Slowly return to the starting position  Straight leg raises:  Lie on your back with one leg straight  Bend the other knee and place your foot flat on the floor  Tighten your abdominal muscles  Keep your leg straight and slowly lift it straight up 6 to 12 inches off the floor  Hold this position  Lower your leg slowly  Do as many repetitions as directed on this side  Repeat with the other leg  Plank:  Lie on your stomach  Bend your elbows and place your forearms flat on the floor  Lift your chest, stomach, and knees off the floor  Make sure your elbows are below your shoulders  Your body should be in a straight line  Do not let your hips or lower back sink to the ground  Squeeze your abdominal muscles together and hold for 15 seconds  To make this exercise harder, hold for 30 seconds or lift 1 leg at a time  Bicycles:  Lie on your back  Bend both knees and bring them toward your chest  Your calves should be parallel to the floor  Place the palms of your hands on the back of your head  Straighten your right leg and keep it lifted 2 inches off the floor  Raise your head and shoulders off the floor and twist towards your left  Keep your head and shoulders lifted  Bend your right knee while you straighten your left leg  Keep your left leg 2 inches off the floor  Twist your head and chest towards the left leg  Continue to straighten 1 leg at a time and twist        Follow up with your doctor as directed:  Write down your questions so you remember to ask them during your visits  © Copyright [employer] 2022 Information is for End User's use only and may not be sold, redistributed or otherwise used for commercial purposes  All illustrations and images included in CareNotes® are the copyrighted property of [employer]  or [employer]   The above information is an  only   It is not intended as medical advice for individual conditions or treatments  Talk to your doctor, nurse or pharmacist before following any medical regimen to see if it is safe and effective for you

## 2022-08-24 NOTE — PROGRESS NOTES
Assessment and Plan:     Problem List Items Addressed This Visit        Nervous and Auditory    Benign head tremor     Stable  Impacted cerumen, bilateral     Start peroxide gtts, follow up with ENT  Musculoskeletal and Integument    Osteopenia of multiple sites     Continue daily supplements, walks  Relevant Orders    CBC and differential    Comprehensive metabolic panel    CBC and differential    Comprehensive metabolic panel    Primary osteoarthritis of right knee     No recent issues  Other    Overweight (BMI 25 0-29  9)     Stable weight  Recommend strengthening, core and toning exercises  Pure hypercholesterolemia     Repeat lipids, taking omega-3 only  Relevant Orders    CBC and differential    Comprehensive metabolic panel    Lipid panel    TSH, 3rd generation with Free T4 reflex    Comprehensive metabolic panel    Lipid panel    TSH, 3rd generation with Free T4 reflex    Vitamin D deficiency    Relevant Orders    Vitamin D 25 hydroxy    Vitamin D 25 hydroxy      Other Visit Diagnoses     Lip symptom    -  Primary    Resolved  ?HSV infection, but no skin lesion  Less likely TIA, CVA, Bell's palsy  Encounter for screening mammogram for breast cancer        Relevant Orders    Mammo screening bilateral w 3d & cad    Health maintenance examination        Mammogram due  Declined flu vaccine  Recommend COVID #2 booster  BMI Counseling: Body mass index is 27 12 kg/m²  The BMI is above normal  Nutrition recommendations include encouraging healthy choices of fruits and vegetables  Exercise recommendations include strength training exercises  Rationale for BMI follow-up plan is due to patient being overweight or obese  Depression Screening and Follow-up Plan: Patient was screened for depression during today's encounter  They screened negative with a PHQ-2 score of 0        Preventive health issues were discussed with patient, and age appropriate screening tests were ordered as noted in patient's After Visit Summary  Personalized health advice and appropriate referrals for health education or preventive services given if needed, as noted in patient's After Visit Summary  History of Present Illness:     Patient presents for a Medicare Wellness Visit and follow up visit  She recalls lip symptoms about 2 months ago, occurred intermittently for about 2 weeks  No facial droop, slurred speech, eye symptoms, extremity weakness or numbness  She reports no rash or skin lesion, no issues with eating or swallowing, no drooling  Symptoms eventually resolved, has not recurred  She noticed an occasional flutter in her heart, would occur about once a month or so and last for a few minutes  Denies any chest pain, shortness of breath, dizziness or other symptoms  She keeps busy at home, walks daily  No falls  No issues with her joints, tremor  Patient Care Team:  Tamika Umana MD as PCP - General (Internal Medicine)  MD Beny Jacques MD     Review of Systems:     Review of Systems   Constitutional: Negative for appetite change and fatigue  HENT: Positive for hearing loss  Negative for congestion, ear pain, postnasal drip and sore throat  Eyes: Negative for pain and visual disturbance  Respiratory: Negative for cough and shortness of breath  Cardiovascular: Negative for chest pain, palpitations and leg swelling  Gastrointestinal: Negative for abdominal pain, constipation, diarrhea and vomiting  Genitourinary: Negative for dysuria, frequency and urgency  Musculoskeletal: Negative for arthralgias, back pain and myalgias  Skin: Negative for rash and wound  Neurological: Positive for tremors  Negative for dizziness, seizures, syncope, numbness and headaches  Psychiatric/Behavioral: Negative for confusion, dysphoric mood and sleep disturbance  The patient is not nervous/anxious      All other systems reviewed and are negative  Problem List:     Patient Active Problem List   Diagnosis    Primary osteoarthritis of right knee    Pure hypercholesterolemia    Osteopenia of multiple sites    Vitamin D deficiency    Varicose vein of leg    Benign head tremor    Overweight (BMI 25 0-29  9)    Impacted cerumen, bilateral      Past Medical and Surgical History:     Past Medical History:   Diagnosis Date    Ankle fracture, right     Depression     Dysphagia     H/O impacted cerumen     Hyperlipidemia     last assessed 02/11/2016    Laryngopharyngeal reflux (LPR)     Muscle tension dysphonia     Throat symptom     tightness     Past Surgical History:   Procedure Laterality Date    APPENDECTOMY      CARPAL TUNNEL RELEASE Bilateral     COLONOSCOPY      2001-tubular adenoma  08/2008-no polyps   07/2011-polyp, diverticulosis, hemorrhoid, Dr Jacobo Pemassimo   07/016-diverticulosis, Dr Jacobo Pemassimo      HEMORRHOID SURGERY      multiple ligations of internal hemorrhoids, last assessed 08/16/2016    HYSTERECTOMY      Fibroids, abdominal hysterectomy age 43    TUBAL LIGATION        Family History:     Family History   Problem Relation Age of Onset    Stroke Mother 68        CVA    Heart disease Father 79        heart attacks/open heart sx    Diabetes Father 79    Coronary artery disease Half-Sister     Esophageal cancer Brother     Liver cancer Brother     Suicidality Brother         suicide completion    Cancer Brother         Liver/throat (worked in [employer])    Thyroid cancer Brother     Thyroid disease Daughter 36    No Known Problems Maternal Grandmother     No Known Problems Maternal Grandfather     No Known Problems Paternal Grandmother     No Known Problems Paternal Grandfather     No Known Problems Daughter     No Known Problems Daughter     Prostate cancer Brother     No Known Problems Maternal Aunt     No Known Problems Maternal Aunt     No Known Problems Maternal Aunt     No Known Problems Maternal Aunt  No Known Problems Paternal Aunt     No Known Problems Paternal Aunt     Alcohol abuse Neg Hx     Substance Abuse Neg Hx     Mental illness Neg Hx     Depression Neg Hx       Social History:     Social History     Socioeconomic History    Marital status: /Civil Union     Spouse name: None    Number of children: 3    Years of education: 15    Highest education level: None   Occupational History    None   Tobacco Use    Smoking status: Never Smoker    Smokeless tobacco: Never Used   Substance and Sexual Activity    Alcohol use: No    Drug use: No    Sexual activity: None   Other Topics Concern    None   Social History Narrative    Housewife    Drinks tea 2-3 daily      Social Determinants of Health     Financial Resource Strain: Low Risk     Difficulty of Paying Living Expenses: Not very hard   Food Insecurity: Not on file   Transportation Needs: No Transportation Needs    Lack of Transportation (Medical): No    Lack of Transportation (Non-Medical): No   Physical Activity: Not on file   Stress: Not on file   Social Connections: Not on file   Intimate Partner Violence: Not on file   Housing Stability: Not on file      Medications and Allergies:     Current Outpatient Medications   Medication Sig Dispense Refill    Calcium Carbonate-Vitamin D 600-200 MG-UNIT TABS Take by mouth      Omega-3 Fatty Acids (OMEGA-3 FISH OIL PO) Take by mouth      Pediatric Multiple Vitamins (CHEWABLE MULTIPLE VITAMINS PO) Take 2 tablets by mouth daily       No current facility-administered medications for this visit       Allergies   Allergen Reactions    Paroxetine Tremor     Head tremor 2001    Kiwi Extract - Food Allergy     Sulfa Antibiotics Rash      Immunizations:     Immunization History   Administered Date(s) Administered    COVID-19 PFIZER VACCINE 0 3 ML IM 02/20/2021, 03/13/2021    Pneumococcal Conjugate 13-Valent 08/17/2017    Pneumococcal Polysaccharide PPV23 08/24/2018    Tdap 06/12/2013 Health Maintenance:         Topic Date Due    Breast Cancer Screening: Mammogram  08/18/2022    Colorectal Cancer Screening  08/18/2028    Hepatitis C Screening  Completed         Topic Date Due    COVID-19 Vaccine (3 - Booster for Pfizer series) 08/13/2021    Influenza Vaccine (1) 09/01/2022      Medicare Screening Tests and Risk Assessments:     Conrado Mcfadden is here for her Subsequent Wellness visit  Last Medicare Wellness visit information reviewed, patient interviewed and updates made to the record today  Health Risk Assessment:   Patient rates overall health as good  Patient feels that their physical health rating is same  Patient is very satisfied with their life  Eyesight was rated as slightly worse  Hearing was rated as slightly worse  Patient feels that their emotional and mental health rating is same  Patients states they are never, rarely angry  Patient states they are sometimes unusually tired/fatigued  Pain experienced in the last 7 days has been none  Patient states that she has experienced no weight loss or gain in last 6 months  Depression Screening:   PHQ-2 Score: 0      Fall Risk Screening: In the past year, patient has experienced: no history of falling in past year      Urinary Incontinence Screening:   Patient has not leaked urine accidently in the last six months  Home Safety:  Patient does not have trouble with stairs inside or outside of their home  Patient has working smoke alarms and has working carbon monoxide detector  Home safety hazards include: none  Nutrition:   Current diet is Regular  Medications:   Patient is currently taking over-the-counter supplements  OTC medications include: see medication list  Patient is able to manage medications       Activities of Daily Living (ADLs)/Instrumental Activities of Daily Living (IADLs):   Walk and transfer into and out of bed and chair?: Yes  Dress and groom yourself?: Yes    Bathe or shower yourself?: Yes    Feed yourself? Yes  Do your laundry/housekeeping?: Yes  Manage your money, pay your bills and track your expenses?: Yes  Make your own meals?: Yes    Do your own shopping?: Yes    Previous Hospitalizations:   Any hospitalizations or ED visits within the last 12 months?: No      Advance Care Planning:   Living will: No    Durable POA for healthcare: No    Advanced directive: No    End of Life Decisions reviewed with patient: Yes      Comments: Long discussion about this  She is an organ donor, would like to donate her body  She is considering cremation but if her body can be used for science she would like to do that instead  POA will be her daughter, no   Cognitive Screening:   Provider or family/friend/caregiver concerned regarding cognition?: No    PREVENTIVE SCREENINGS      Cardiovascular Screening:    General: History Lipid Disorder    Due for: Lipid Panel      Diabetes Screening:       Due for: Blood Glucose      Colorectal Cancer Screening:     General: Screening Current      Breast Cancer Screening:     General: Screening Current    Due for: Mammogram        Cervical Cancer Screening:    General: Screening Not Indicated      Osteoporosis Screening:    General: Screening Current      Abdominal Aortic Aneurysm (AAA) Screening:        General: Screening Not Indicated      Lung Cancer Screening:     General: Screening Not Indicated      Hepatitis C Screening:    General: Screening Current    Screening, Brief Intervention, and Referral to Treatment (SBIRT)    Screening  Typical number of drinks in a day: 0  Typical number of drinks in a week: 0  Interpretation: Low risk drinking behavior      Single Item Drug Screening:  How often have you used an illegal drug (including marijuana) or a prescription medication for non-medical reasons in the past year? never    Single Item Drug Screen Score: 0  Interpretation: Negative screen for possible drug use disorder    Other Counseling Topics:   Regular weightbearing exercise and calcium and vitamin D intake  No exam data present     Physical Exam:     /62   Pulse 88   Temp (!) 96 7 °F (35 9 °C)   Resp 16   Ht 5' 4" (1 626 m)   Wt 71 7 kg (158 lb)   SpO2 98%   BMI 27 12 kg/m²     Physical Exam  Vitals and nursing note reviewed  Constitutional:       General: She is not in acute distress  Appearance: She is well-developed  HENT:      Head: Normocephalic and atraumatic  Right Ear: There is impacted cerumen  Left Ear: There is impacted cerumen  Eyes:      Conjunctiva/sclera: Conjunctivae normal       Pupils: Pupils are equal, round, and reactive to light  Cardiovascular:      Rate and Rhythm: Normal rate and regular rhythm  Heart sounds: No murmur heard  Pulmonary:      Effort: Pulmonary effort is normal  No respiratory distress  Breath sounds: Normal breath sounds  Abdominal:      General: Bowel sounds are normal       Palpations: Abdomen is soft  Tenderness: There is no abdominal tenderness  Musculoskeletal:         General: Normal range of motion  Cervical back: Normal range of motion and neck supple  Right lower leg: No edema  Left lower leg: No edema  Skin:     General: Skin is warm and dry  Neurological:      General: No focal deficit present  Mental Status: She is alert and oriented to person, place, and time  Psychiatric:         Mood and Affect: Mood and affect normal          Behavior: Behavior normal           Migel Engle MD     Labs & imaging results reviewed with patient

## 2022-08-25 ENCOUNTER — TELEPHONE (OUTPATIENT)
Dept: INTERNAL MEDICINE CLINIC | Facility: CLINIC | Age: 72
End: 2022-08-25

## 2022-08-25 DIAGNOSIS — E78.00 PURE HYPERCHOLESTEROLEMIA: Primary | ICD-10-CM

## 2022-08-25 NOTE — LETTER
Heart Healthy Diet   WHAT YOU NEED TO KNOW:   A heart healthy diet is an eating plan low in unhealthy fats and sodium (salt)  The plan is high in healthy fats and fiber  A heart healthy diet helps improve your cholesterol levels and lowers your risk for heart disease and stroke  A dietitian will teach you how to read and understand food labels  DISCHARGE INSTRUCTIONS:   Heart healthy diet guidelines to follow:   1  Choose foods that contain healthy fats  ? Unsaturated fats  include monounsaturated and polyunsaturated fats  Unsaturated fat is found in foods such as soybean, canola, olive, corn, and safflower oils  It is also found in soft tub margarine that is made with liquid vegetable oil  ? Omega-3 fat  is found in certain fish, such as salmon, tuna, and trout, and in walnuts and flaxseed  Eat fish high in omega-3 fats at least 2 times a week  2  Get 20 to 30 grams of fiber each day  Fruits, vegetables, whole-grain foods, and legumes (cooked beans) are good sources of fiber  3  Limit or do not have unhealthy fats  ? Cholesterol  is found in animal foods, such as eggs and lobster, and in dairy products made from whole milk  Limit cholesterol to less than 200 mg each day  ? Saturated fat  is found in meats, such as pereira and hamburger  It is also found in chicken or turkey skin, whole milk, and butter  Limit saturated fat to less than 7% of your total daily calories  ? Trans fat  is found in packaged foods, such as potato chips and cookies  It is also in hard margarine, some fried foods, and shortening  Do not eat foods that contain trans fats  4  Limit sodium as directed  You may be told to limit sodium to 2,000 to 2,300 mg each day  Choose low-sodium or no-salt-added foods  Add little or no salt to food you prepare  Use herbs and spices in place of salt         Include the following in your heart healthy plan:  Ask your dietitian or healthcare provider how many servings to have from each of the following food groups:  1  Grains:      ? Whole-wheat breads, cereals, and pastas, and brown rice    ? Low-fat, low-sodium crackers and chips    2  Vegetables:      ? Broccoli, green beans, green peas, and spinach    ? Collards, kale, and lima beans    ? Carrots, sweet potatoes, tomatoes, and peppers    ? Canned vegetables with no salt added    3  Fruits:      ? Bananas, peaches, pears, and pineapple    ? Grapes, raisins, and dates    ? Oranges, tangerines, grapefruit, orange juice, and grapefruit juice    ? Apricots, mangoes, melons, and papaya    ? Raspberries and strawberries    ? Canned fruit with no added sugar    4  Low-fat dairy:      ? Nonfat (skim) milk, 1% milk, and low-fat almond, cashew, or soy milks fortified with calcium    ? Low-fat cheese, regular or frozen yogurt, and cottage cheese    5  Meats and proteins:      ? Lean cuts of beef and pork (loin, leg, round), skinless chicken and turkey    ? Legumes, soy products, egg whites, or nuts    Limit or do not include the following in your heart healthy plan:   1  Unhealthy fats and oils:      ? Whole or 2% milk, cream cheese, sour cream, or cheese    ? High-fat cuts of beef (T-bone steaks, ribs), chicken or turkey with skin, and organ meats such as liver    ? Butter, stick margarine, shortening, and cooking oils such as coconut or palm oil    2  Foods and liquids high in sodium:      ? Packaged foods, such as frozen dinners, cookies, macaroni and cheese, and cereals with more than 300 mg of sodium per serving    ? Vegetables with added sodium, such as instant potatoes, vegetables with added sauces, or regular canned vegetables    ? Cured or smoked meats, such as hot dogs, pereira, and sausage    ? High-sodium ketchup, barbecue sauce, salad dressing, pickles, olives, soy sauce, or miso    3  Foods and liquids high in sugar:      ? Candy, cake, cookies, pies, or doughnuts    ?  Soft drinks (soda), sports drinks, or sweetened tea    ? Canned or dry mixes for cakes, soups, sauces, or gravies    Other healthy heart guidelines:   · Do not smoke  Nicotine and other chemicals in cigarettes and cigars can cause lung and heart damage  Ask your healthcare provider for information if you currently smoke and need help to quit  E-cigarettes or smokeless tobacco still contain nicotine  Talk to your healthcare provider before you use these products  · Limit or do not drink alcohol as directed  Alcohol can damage your heart and raise your blood pressure  Your healthcare provider may give you specific daily and weekly limits  The general recommended limit is 1 drink a day for women 21 or older and for men 72 or older  Do not have more than 3 drinks in a day or 7 in a week  The recommended limit is 2 drinks a day for men 24to 59years of age  Do not have more than 4 drinks in a day or 14 in a week  A drink of alcohol is 12 ounces of beer, 5 ounces of wine, or 1½ ounces of liquor  · Exercise regularly  Exercise can help you maintain a healthy weight and improve your blood pressure and cholesterol levels  Regular exercise can also decrease your risk for heart problems  Ask your healthcare provider about the best exercise plan for you  Do not start an exercise program without asking your healthcare provider  Follow up with your doctor or cardiologist as directed:  Write down your questions so you remember to ask them during your visits  The above information is an  only  It is not intended as medical advice for individual conditions or treatments  Talk to your doctor, nurse or pharmacist before following any medical regimen to see if it is safe and effective for you

## 2022-08-25 NOTE — TELEPHONE ENCOUNTER
Lab results: Your cholesterol remains very high  Recommend to start low dose statin (crestor 5 mg), even if you take it 2 or 3 days a week only  Let me know, I can send to the pharmacy      The rest of your labs were normal

## 2022-08-25 NOTE — TELEPHONE ENCOUNTER
Patient is against medication did not jeny let me finish dosing  States she will focus on diet instead as much as she can , stated she does not eat fried fatty processed foods, didn't not let me finish mentioning other foods  Patient would like a print out mailed home in regards what to eat and what to stay away from, also would like lab work mailed home

## 2022-08-31 ENCOUNTER — ANNUAL EXAM (OUTPATIENT)
Dept: OBGYN CLINIC | Facility: CLINIC | Age: 72
End: 2022-08-31
Payer: MEDICARE

## 2022-08-31 VITALS
SYSTOLIC BLOOD PRESSURE: 156 MMHG | DIASTOLIC BLOOD PRESSURE: 72 MMHG | BODY MASS INDEX: 27.49 KG/M2 | WEIGHT: 161 LBS | HEIGHT: 64 IN

## 2022-08-31 DIAGNOSIS — Z01.419 ENCOUNTER FOR GYNECOLOGICAL EXAMINATION WITHOUT ABNORMAL FINDING: Primary | ICD-10-CM

## 2022-08-31 PROCEDURE — G0145 SCR C/V CYTO,THINLAYER,RESCR: HCPCS | Performed by: OBSTETRICS & GYNECOLOGY

## 2022-08-31 PROCEDURE — G0101 CA SCREEN;PELVIC/BREAST EXAM: HCPCS | Performed by: OBSTETRICS & GYNECOLOGY

## 2022-08-31 NOTE — PROGRESS NOTES
Assessment/Plan:         There are no diagnoses linked to this encounter  Subjective:      Patient ID: Steffi Vidal is a 70 y o  female  The patient is a 42-year-old  3 para 200 postmenopausal woman who underwent hysterectomy for benign indications who presents today for an annual exam   She has no complaints related to OBGYN  She has been in good health over the past 2 years  She had a normal mammogram within the year and has an appointment for another 1 following her anniversary date  We will perform a Pap smear today  We will see her back in 1 year or as needed  The following portions of the patient's history were reviewed and updated as appropriate: allergies, current medications, past family history, past medical history, past social history, past surgical history and problem list     Review of Systems   Constitutional: Negative for chills, diaphoresis, fatigue, fever and unexpected weight change  HENT: Negative for congestion, sinus pressure, sinus pain, tinnitus and trouble swallowing  Eyes: Negative for visual disturbance  Respiratory: Negative for cough, chest tightness and shortness of breath  Cardiovascular: Negative for chest pain, palpitations and leg swelling  Gastrointestinal: Negative for abdominal distention, abdominal pain, anal bleeding, constipation, diarrhea, nausea, rectal pain and vomiting  Endocrine: Negative for heat intolerance  Genitourinary: Negative for difficulty urinating, dysuria, flank pain, frequency, genital sores, hematuria and urgency  Musculoskeletal: Negative for arthralgias, back pain and joint swelling  Skin: Negative for rash  Allergic/Immunologic: Negative for environmental allergies and food allergies  Neurological: Negative for headaches  Hematological: Negative for adenopathy  Does not bruise/bleed easily  Psychiatric/Behavioral: Negative for decreased concentration and dysphoric mood   The patient is not nervous/anxious  Objective:      /72 (BP Location: Left arm)   Ht 5' 4" (1 626 m)   Wt 73 kg (161 lb)   BMI 27 64 kg/m²          Physical Exam  Vitals and nursing note reviewed  Exam conducted with a chaperone present  Constitutional:       General: She is not in acute distress  Appearance: Normal appearance  She is not ill-appearing  HENT:      Head: Normocephalic and atraumatic  Nose: Nose normal       Mouth/Throat:      Mouth: Mucous membranes are moist       Pharynx: Oropharynx is clear  Eyes:      Extraocular Movements: Extraocular movements intact  Cardiovascular:      Rate and Rhythm: Normal rate and regular rhythm  Pulses: Normal pulses  Heart sounds: Normal heart sounds  No murmur heard  No friction rub  Pulmonary:      Effort: Pulmonary effort is normal       Breath sounds: Normal breath sounds  Chest:   Breasts:      Right: Normal  No mass, tenderness or axillary adenopathy  Left: Normal  No mass, tenderness or axillary adenopathy  Abdominal:      General: Abdomen is flat  Bowel sounds are normal  There is no distension  Palpations: Abdomen is soft  There is no mass  Tenderness: There is no abdominal tenderness  There is no guarding  Hernia: No hernia is present  Genitourinary:     General: Normal vulva  Exam position: Lithotomy position  Ricardo stage (genital): 5  Labia:         Right: No rash, tenderness or lesion  Left: No rash, tenderness or lesion  Vagina: Normal       Uterus: Absent  Adnexa: Right adnexa normal and left adnexa normal         Right: No mass or tenderness  Left: No mass  Musculoskeletal:      Cervical back: Normal range of motion and neck supple  Lymphadenopathy:      Upper Body:      Right upper body: No axillary adenopathy  Left upper body: No axillary adenopathy  Skin:     General: Skin is warm and dry     Neurological:      General: No focal deficit present  Mental Status: She is alert and oriented to person, place, and time  Psychiatric:         Mood and Affect: Mood normal          Behavior: Behavior normal          Thought Content:  Thought content normal          Judgment: Judgment normal

## 2022-09-06 NOTE — TELEPHONE ENCOUNTER
You should have received a list of low fat foods  (Staff: please mail again), please disregard potassium info  Yes, you can try taking Rescue LDL  Recommend to take omega-3 capsules instead of red yeast rice, up to 3000 mg daily  Agree with CoQ 10 100 mg daily

## 2022-09-06 NOTE — TELEPHONE ENCOUNTER
Patient received list of foods - she's confused -she's not sure if she needs to eat foods higher or lower in potassium? For her cholesterol - she'd like to know if she can take Rescue LDL XQ10 - serving is 2 capsules = Red Yeast Rice 1200 mg + CoQ10 50 mg x twice daily for a total of 2400 Red Yeast Rice and 100 mg of CoQ10? Mailed all lab results

## 2022-09-12 NOTE — TELEPHONE ENCOUNTER
Pt wants to know if she can take Rescue (no red yeast rice)  2 soft gels contain Omega 3 - 800 mg,  mg,  mg, other omega 3 100 mg  Instructions are to take 4-6 daily for best results  Ok to take?

## 2022-09-13 LAB
LAB AP GYN PRIMARY INTERPRETATION: NORMAL
Lab: NORMAL

## 2022-09-26 ENCOUNTER — TELEPHONE (OUTPATIENT)
Dept: GENETICS | Facility: CLINIC | Age: 72
End: 2022-09-26

## 2022-09-26 NOTE — TELEPHONE ENCOUNTER
Patient called to schedule her new patient appointment, she asked since her  will be joining her for the appointment if it can be a joint appointment  They would both like to get tested to determine who carries the gene their daughter tested positive   Appointment scheduled for tomorrow 9/27 at 10:30 am

## 2022-09-27 ENCOUNTER — CLINICAL SUPPORT (OUTPATIENT)
Dept: GENETICS | Facility: CLINIC | Age: 72
End: 2022-09-27
Payer: MEDICARE

## 2022-09-27 DIAGNOSIS — Z80.0 FAMILY HISTORY OF STOMACH CANCER: ICD-10-CM

## 2022-09-27 DIAGNOSIS — Z80.3 FAMILY HISTORY OF BREAST CANCER: ICD-10-CM

## 2022-09-27 DIAGNOSIS — Z84.81 FH: GENETIC DISEASE CARRIER: Primary | ICD-10-CM

## 2022-09-27 DIAGNOSIS — Z80.42 FAMILY HISTORY OF PROSTATE CANCER: ICD-10-CM

## 2022-09-27 PROCEDURE — NC001 PR NO CHARGE: Performed by: GENETIC COUNSELOR, MS

## 2022-09-27 PROCEDURE — 36415 COLL VENOUS BLD VENIPUNCTURE: CPT

## 2022-09-27 NOTE — PROGRESS NOTES
Pre-Test Genetic Counseling Consult Note    Patient Name: Winifred David   /Age: 1950/71 y o  Referring Provider: Self-referred    Date of Service: 2022  Genetic Counselor: Tosin Spicer MS, [facility]  Interpretation Services: None  [facility]      Kimo Lomeli was referred to the [address] [place] [address] and Genetic Assessment Program due to her family history of cancer (stomach, breast, prostate) and familial MUTYH mutation  she presents today to discuss the possibility of a hereditary cancer syndrome, options for genetic testing, and implications for her and her family  Her , Rut Barcenas, accompanied her to the appointment  Cancer History and Treatment:   Personal History: no personal history of cancer    Screening Hx:   Breast:  Breast Imagin21  Breast Biopsy: none  Breast Density: Heterogeneously dense    Colon:  Colonoscopy: every 10 years sicne 72  History of 2 polyps at age 48, one was a tubularadenoma    Gynecologic:  Ovaries intact DARSHANA 36    Skin:  saw derm once    Other screening: none    Reproductive History  Age at menarche: 8  Age at first live birth: 21  Menopause: Post Menopausal (na)  Hormone replacement: none    Medical and Surgical History  Pertinent surgical history:   Past Surgical History:   Procedure Laterality Date    APPENDECTOMY      CARPAL TUNNEL RELEASE Bilateral     COLONOSCOPY      -tubular adenoma  2008-no polyps   2011-polyp, diverticulosis, hemorrhoid, Dr Carroll Wooten   -diverticulosis, Dr Carroll Wooten      HEMORRHOID SURGERY      multiple ligations of internal hemorrhoids, last assessed 2016    HYSTERECTOMY      Fibroids, abdominal hysterectomy age 39    TONSILLECTOMY      TUBAL LIGATION        Pertinent medical history:  Past Medical History:   Diagnosis Date    Ankle fracture, right     Depression     Dysphagia     H/O impacted cerumen     Hyperlipidemia     last assessed 02/11/2016    Laryngopharyngeal reflux (LPR)     Muscle tension dysphonia     Throat symptom     tightness         Other History:  Height:   Ht Readings from Last 1 Encounters:   09/19/22 5' 4" (1 626 m)     Weight:   Wt Readings from Last 1 Encounters:   09/19/22 72 6 kg (160 lb)       Relevant Family History   Patient reports no Ashkenazi Jainism ancestry  Daughter (52) recently diagnosed with breast cnacer; underwnet 36 gene panel which identified a heterozygous MUTYH mutation (c 1187G>A (p G396D) and a VUS in BRCA1 (c 1471C>G (p Q491E)  Brother (68) history of prostate cancer (dx 61)   Son (d 45) history of metastatic cancer NOS   Son (d 47) history of skin cancer (dx 55)   Son (d 56) history of stomach cancer (dx 47)  Brother (d 47) history of esophageal and stomach cancer (dx 39)    Maternal Family History:  [de-identified] sister (78) history of breast cancer (dx 39)  No other reported history of cancer    Paternal Family History:   Aunt (d 85) history of bone cancer (dx 80)   No other reported history of cancer    Please refer to the scanned pedigree in the Media Tab for a complete family history     *All history is reported as provided by the patient; records are not available for review, except where indicated  Assessment:  1  Meets NCCN [id number] [id number] Testing Criteria for High-Penetrance Breast Cancer Susceptibility Genes: family history of a second-degree relative diagnosed with breast cancer under 50   2  Meets NCCN [id number] Gastric Cancer Criteria for Further Risk Evaluation: gastric cancer 2 first- or second-degree relatives with one diagnosed before 48  3  Meets NCCN F1 [id number] Testing Criteria for Adenomatous Polyposis:    - Individuals with any blood relative with a known pathogenic/likely pathogenic variant in a cancer susceptibility gene    Specifically, Patricia's daughter carries a MUTYH mutation    We explained that the likelihood that Dereje Brand harbors the same MUTYH pathogenic variant identified in her daughter is 50%  We reviewed the cancer risks associated with MUTYH pathogenic variants as well as the NCCN screening recommendations and risk-reduction options available if [facility] Moe tests positive for the familial variant  We discussed that being a MUTYH carrier is common in the  population (1-2%)  We also discussed that current risk-estimates for oclon cancer risk are not clear and are at this point to the age of 79  Therefore, testing for this variant would not change much about Patricia's current management, but would inform which side of the family the MUTYH variant is tracking with  Genetic testing for hereditary cancer is available via single gene analysis or panel testing of multiple genes associated with an increased risk of cancer  There is a family history of cancer on Patricia's maternal side of the family that is not explained by this variant  Additional testing is recommended  The risks, benefits, and limitations of genetic testing were reviewed with the patient, as well as genetic discrimination laws, and possible test results (positive or negative) and their clinical implications  C[facility] decided to proceed with testing and provided consent  Plan:   Summary: Genetic testing is indicated for Cas[facility] Moe  Sample Collection: The patient's blood sample was drawn in the office on 9/27 by medical assistant Braxton Pena  Genetic Testing Preformed: Single Site Testing for the MUTYH c 1187G>A (p G396D) with Dayron Kerns (36 genes): APC, GRICEL, AXIN2 BARD1, BRCA1, BRCA2, BRIP1, BMPR1A, CDH1, CDK4, CDKN2A, CHEK2, DICER1, EPCAM, GREM1, HOXB13, MLH1, MSH2, MSH3, MSH6, MUTYH, NBN, NF1, NTHL1, PALB2, PMS2, POLD1, POLE, PTEN, RAD51C, RAD51D, RECQL SMAD4, SMARCA4, STK11, TP53    Results take approximately 2-3 weeks to complete once test is started  We will contact [facility] once results are available

## 2022-10-05 ENCOUNTER — TELEPHONE (OUTPATIENT)
Dept: INTERNAL MEDICINE CLINIC | Facility: CLINIC | Age: 72
End: 2022-10-05

## 2022-10-05 DIAGNOSIS — E66.3 OVERWEIGHT (BMI 25.0-29.9): ICD-10-CM

## 2022-10-05 DIAGNOSIS — E78.00 PURE HYPERCHOLESTEROLEMIA: Primary | ICD-10-CM

## 2022-10-05 NOTE — TELEPHONE ENCOUNTER
We will be getting a genetic testing report on pt  She has a family hx of colon and breast cancers  Pt  would like a copy of that report when we get it

## 2022-10-06 ENCOUNTER — HOSPITAL ENCOUNTER (OUTPATIENT)
Dept: RADIOLOGY | Age: 72
Discharge: HOME | End: 2022-10-06
Payer: MEDICARE

## 2022-10-06 VITALS — WEIGHT: 160 LBS | HEIGHT: 64 IN | BODY MASS INDEX: 27.31 KG/M2

## 2022-10-06 DIAGNOSIS — Z12.31 ENCOUNTER FOR SCREENING MAMMOGRAM FOR BREAST CANCER: ICD-10-CM

## 2022-10-06 PROCEDURE — 77067 SCR MAMMO BI INCL CAD: CPT

## 2022-10-06 PROCEDURE — 77063 BREAST TOMOSYNTHESIS BI: CPT

## 2022-10-12 ENCOUNTER — TELEPHONE (OUTPATIENT)
Dept: GENETICS | Facility: CLINIC | Age: 72
End: 2022-10-12

## 2022-10-12 PROBLEM — H61.23 IMPACTED CERUMEN, BILATERAL: Status: RESOLVED | Noted: 2021-08-20 | Resolved: 2022-10-12

## 2022-10-12 NOTE — TELEPHONE ENCOUNTER
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), Heterozygous, Pathogenic     Assessment:   Jose Siu 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, specifically c 1187G>A (p G396D)  The MUTYH gene is associated with autosomal dominant predisposition colon cancer and autosomal recessive MUTYH-associated Polyposis (MAP) ([email or web address].com UID: [id number] )    Heterozygous MUTYH Mutation Carrier  Pathogenic and likely pathogenic variants in one copy of the MUTYH gene are associated with a ‘moderate’  increased risk for cancer as compared to the general population  Studies have shown conflicting results for the lifetime colon cancer risk (by age 79)  In large population-based studies the risk was marginally increased with an OR of 1 07-1 2 (approximately 6% lifetime risk)  Whereas family-based studies found a higher risk compared to the general population with an OR of 2-3 (approximately 10-15% lifetime risk)  The general population risk for colorectal cancer is approximately 5%  There is preliminary evidence supporting a correlation between a single MUTYH variant and a predisposition to breast and possibly other cancer types however the evidence is preliminary and insufficient to make a determination regarding these relationships  Reproductive Risks:  Being a carrier of a MUTYH mutation is common among individuals of  ancestry    It is estimated that approximately 1-2% of individuals of  ancestry are carries of an MUTYH variant  When an individual carries two pathogenic variants in the MUTYH gene they have a condition called MUTYH-associated polyposis (MAP)  MAP is characterized by  colon polyps (seen on average around the age of 48) and an 80% lifetime risk for colorectal cancer (PMID: 75424028)  If both parents are MUTYH mutation carriers, then they will have a 25% chance (1 in 4) to have a child affected with MAP  Risk and Testing for Family Members: This test result may help clarify the risk for other family members to develop cancer  All first-degree relatives (parents, siblings and children) have up to a 50% chance of having the pathogenic variant  Other relatives such as aunts, uncles and cousins may also be at risk  Since it is not known with one-hundred percent certainty which side of the family this MUTYH pathogenic variant came from, we recommend that Kimmie Rivera share this test results with extended family members from both sides of her family  We encouraged Kimmie Rivera  to discuss this information with her relatives  If Kimmie Rivera family members have any questions or are interested in testing they can reach out to the IMScouting number at [phone] for additional information  When a person is found to have a single MUTYH pathogenic variant, it's recommended that their partner is tested for mutations in this gene as well, or that their children (if they are of age) are tested to see if they carry 2 pathogenic mutations to assess their cancer risk  [facility] offers free family member testing for any of Patricia's blood relatives up to 80 days following a positive genetic test result   If any of Patricia's family members have any questions regarding genetic testing would like to pursue genetic testing to understand if they carry the same MUTYH mutation as Kimmie Rivera they can reach out to the IMScouting number at [phone] for additional information  Management:  Management guidelines for individuals with a single MUTYH pathogenic or likely pathogenic variant have been developed by the [facility]  Please refer to the current NCCN guidelines for the most up to date recommendations  The recommendations listed below are specific to Victorino Salcedo and are based on the V1 2022 Genetic/Familial High-Risk Assessment: Colorectal Guideline  These recommendations are subject to change over time and the newest guidelines should be referenced for the most up to date screening and management recommendations  Plan:      Colon Cancer Screening:   Given conflicting evidence regarding colon cancer risk associated with carrying a heterozygous MUTYH pathogenic variant, NCCN recommends specialized screening for colon cancer mainly based on family history  For individuals unaffected by CRC with NO family history of CRC: Data are unclear as to whether specialized screening is necessary    Other Screening: There are no additional recommendations based on Patricia's result  she should continue cancer screening and medical management as clinically indicated and as determined appropriate by her healthcare providers      Positive Result: Victorino Salcedo was strongly encouraged to follow up on with our office on an annual basis to review the most up to date guidelines as recommendations are subject to change over time

## 2022-10-13 NOTE — TELEPHONE ENCOUNTER
Please call patient  You spoke to genetic counselor yesterday  Will they send / mail you the report or do you still need a copy?

## 2022-10-13 NOTE — TELEPHONE ENCOUNTER
You may continue taking fish oil capsules 4 a day ( no more than 4000 mg daily)  Would you like me to recheck your cholesterol in 6 months or wait until you yearly appt? Let me know, I can just order it

## 2022-10-13 NOTE — TELEPHONE ENCOUNTER
Pt notified     They said they would mail to her, she will let us know    Patient is taking 4 fish oil daily   Tried one day of one CoQ-10, did not feel well and will not take it

## 2022-10-13 NOTE — TELEPHONE ENCOUNTER
Spoke w/ pt  And adv'd  She would like to check her cholesterol in 6 mos  Would also like referral to a dietician to talk about what food she should eat to lower her cholesterol

## 2022-10-17 NOTE — TELEPHONE ENCOUNTER
Pt received list of foods to eat and not to eat  However, there is no serving recommendation for each group, or amount of fiber in the foods  She's having a hard time figuring it out  The paper says to call dietician for recommendations

## 2022-10-17 NOTE — TELEPHONE ENCOUNTER
Referral to nutrition sent already, not sure if it is still available in [facility]      Can try calling another registered dietician

## 2022-10-18 NOTE — TELEPHONE ENCOUNTER
Pt states that she is unable to see a dietician  She made the appt initially and they advised her to call her insurance and make sure it is a covered service  Her insurance will only cover dietician visits if the patient is a kidney patient on dialysis, that includes all dieticians  She states all she needs is a paper to be mailed with recommended servings

## 2022-11-09 ENCOUNTER — TELEPHONE (OUTPATIENT)
Dept: OTHER | Facility: OTHER | Age: 72
End: 2022-11-09

## 2022-11-09 NOTE — TELEPHONE ENCOUNTER
Pt would like a call back regarding setting up a colonoscopy   She can be reached at # [phone]

## 2022-12-02 ENCOUNTER — OFFICE VISIT (OUTPATIENT)
Dept: GASTROENTEROLOGY | Facility: CLINIC | Age: 72
End: 2022-12-02

## 2022-12-02 ENCOUNTER — TELEPHONE (OUTPATIENT)
Dept: GASTROENTEROLOGY | Facility: CLINIC | Age: 72
End: 2022-12-02

## 2022-12-02 VITALS
DIASTOLIC BLOOD PRESSURE: 75 MMHG | WEIGHT: 164 LBS | BODY MASS INDEX: 28 KG/M2 | HEIGHT: 64 IN | SYSTOLIC BLOOD PRESSURE: 152 MMHG | HEART RATE: 79 BPM

## 2022-12-02 DIAGNOSIS — Z86.010 HX OF ADENOMATOUS COLONIC POLYPS: ICD-10-CM

## 2022-12-02 DIAGNOSIS — Z15.09 BIALLELIC MUTATION OF MUTYH GENE: Primary | ICD-10-CM

## 2022-12-02 DIAGNOSIS — Z12.11 COLON CANCER SCREENING: ICD-10-CM

## 2022-12-02 PROBLEM — Z86.0101 HX OF ADENOMATOUS COLONIC POLYPS: Status: ACTIVE | Noted: 2022-12-02

## 2022-12-02 NOTE — PROGRESS NOTES
[facility] - Outpatient Follow-up Note  Alexis Lemus 67 y o  female MRN: [id number]  Encounter: [id number]          ASSESSMENT AND PLAN:      1  Biallelic mutation of MUTYH gene  Patient was found to have positive MUTYH gene for colon cancer  Recommendations were for colonoscopy for further evaluation   - Colonoscopy; Schedule for colonoscopy  Prep and procedure explained to patient in detail  Further recommendations pending results of colonoscopy  - polyethylene glycol (GOLYTELY) 4000 mL solution; Take 4,000 mL by mouth once for 1 dose TAKE AS DIRECTED BY OFFICE PRIOR TO PROCEDURE  Dispense: 4000 mL; Refill: 0    2  Hx of adenomatous colonic polyps  3  Colon cancer screening  Patient has history tubular adenoma polyps  Last colonoscopy done 2018 which showed moderate severe diverticulosis in sigmoid colon  No polyp seen at that time  Patient denies any lower GI symptoms  Recommendations were for repeat colonoscopy in 3 years  - Colonoscopy; schedule for colonoscopy  Prep and procedure explained to patient in detail  Further recommendations pending results of colonoscopy  - polyethylene glycol (GOLYTELY) 4000 mL solution; Take 4,000 mL by mouth once for 1 dose TAKE AS DIRECTED BY OFFICE PRIOR TO PROCEDURE  Dispense: 4000 mL; Refill: 0    Follow-up per physician after colonoscopy     ______________________________________________________________________    SUBJECTIVE: Wai Callahan is a 42-year-old female who presents to office with recent diagnosis of MUTYH gene for colon cancer, history of tubular adenomas, and diverticulosis  Patient currently denies any GI symptoms  Patient denies nausea, vomiting, acid reflux, heartburn, dysphagia, epigastric or abdominal pain  Patient denies blood in stool, blood from rectal area, or black tarry stools  Patient reports that her bowel patterns are regular for most part except for rare episodes of hard stool    Patient reports she does move her bowels daily  Abdomen exam benign no abdominal tenderness or guarding  Patient's daughter was recently diagnosed with breast cancer and further genetic workup showed positive MUTYH gene  Family members were encouraged to be worked up to check if they also had a genetically 2 colon cancer  Patient is test was done September 22 and was found to be positive MUTYH gene  Patient is on no blood thinners or anti coagulation medication  Patient does not smoke  No known family history of colon cancer  REVIEW OF SYSTEMS IS OTHERWISE NEGATIVE  Historical Information   Past Medical History:   Diagnosis Date   • Ankle fracture, right    • Depression    • Dysphagia    • H/O impacted cerumen    • Hyperlipidemia     last assessed 02/11/2016   • Laryngopharyngeal reflux (LPR)    • Muscle tension dysphonia    • Throat symptom     tightness     Past Surgical History:   Procedure Laterality Date   • APPENDECTOMY     • CARPAL TUNNEL RELEASE Bilateral    • COLONOSCOPY      2001-tubular adenoma  08/2008-no polyps   07/2011-polyp, diverticulosis, hemorrhoid, Dr Cameron Maradiaga   07/016-diverticulosis, Dr Cameron Maradiaga     • HEMORRHOID SURGERY      multiple ligations of internal hemorrhoids, last assessed 08/16/2016   • HYSTERECTOMY      Fibroids, abdominal hysterectomy age 39   • TONSILLECTOMY     • TUBAL LIGATION       Social History   Social History     Substance and Sexual Activity   Alcohol Use No     Social History     Substance and Sexual Activity   Drug Use No     Social History     Tobacco Use   Smoking Status Never   Smokeless Tobacco Never     Family History   Problem Relation Age of Onset   • Stroke Mother 68        CVA   • Heart disease Father 79        heart attacks/open heart sx   • Diabetes Father 79   • Thyroid disease Daughter 36   • BRCA1 Negative Daughter         inconclusive-positive for colon mutation   • Breast cancer Daughter 52   • No Known Problems Daughter    • No Known Problems Maternal Grandmother    • No Known Problems Maternal Grandfather    • No Known Problems Paternal Grandmother    • No Known Problems Paternal Grandfather    • Esophageal cancer Brother    • Liver cancer Brother    • Suicidality Brother         suicide completion   • Cancer Brother 52        Liver/throat (worked in [employer])   • Thyroid cancer Brother 52   • Prostate cancer Brother 63   • No Known Problems Maternal Aunt    • No Known Problems Maternal Aunt    • No Known Problems Maternal Aunt    • No Known Problems Maternal Aunt    • Bone cancer Paternal Aunt 78        third rib   • No Known Problems Paternal Aunt    • Breast cancer Half-Sister 50   • Coronary artery disease Half-Sister    • Cancer Half-Brother 79        multiple cancers   • Cancer Other 39        multiple cancers when diagnosed   • Melanoma Other         in his 42's   • Stomach cancer Other 64   • Alcohol abuse Neg Hx    • Substance Abuse Neg Hx    • Mental illness Neg Hx    • Depression Neg Hx        Meds/Allergies       Current Outpatient Medications:   •  Calcium Carbonate-Vitamin D 600-200 MG-UNIT TABS  •  Omega-3 Fatty Acids (OMEGA-3 FISH OIL PO)  •  Pediatric Multiple Vitamins (CHEWABLE MULTIPLE VITAMINS PO)  •  polyethylene glycol (GOLYTELY) 4000 mL solution    Allergies   Allergen Reactions   • Paroxetine Tremor     Head tremor 2001   • Kiwi Extract - Food Allergy    • Sulfa Antibiotics Rash           Objective     Blood pressure 152/75, pulse 79, height 5' 4" (1 626 m), weight 74 4 kg (164 lb), not currently breastfeeding  Body mass index is 28 15 kg/m²  PHYSICAL EXAM:      General Appearance:   Alert, cooperative, no distress   HEENT:   Normocephalic, atraumatic, anicteric      Neck:  Supple, symmetrical, trachea midline   Lungs:   Clear to auscultation bilaterally; no rales, rhonchi or wheezing; respirations unlabored    Heart[de-identified]   Regular rate and rhythm; no murmur, rub, or gallop     Abdomen:   Soft, non-tender, non-distended; normal bowel sounds; no masses, no organomegaly    Genitalia:   Deferred    Rectal:   Deferred    Extremities:  No cyanosis, clubbing or edema    Pulses:  2+ and symmetric    Skin:  No jaundice, rashes, or lesions    Lymph nodes:  No palpable cervical lymphadenopathy        Lab Results:   No visits with results within 1 Day(s) from this visit  Latest known visit with results is:   Clinical Support on 09/27/2022   Component Date Value   • Miscellaneous Lab Test R* 09/27/2022           Radiology Results:   No results found

## 2022-12-02 NOTE — TELEPHONE ENCOUNTER
Scheduled date of colonoscopy (as of today):2/7/23  Physician performing colonoscopy:Alonso  Location of colonoscopy: [facility]  Bowel prep reviewed with patient: GoLytely  Instructions reviewed with patient by:Trina BANGURA  Clearances: None

## 2023-01-30 ENCOUNTER — TELEPHONE (OUTPATIENT)
Dept: GASTROENTEROLOGY | Facility: CLINIC | Age: 73
End: 2023-01-30

## 2023-01-30 NOTE — TELEPHONE ENCOUNTER
Left message for patient reminding her of her colonoscopy 2/7 with Dr Regan Dean at [facility]  Reminded her she will need a , be on clear liquids day before and bowel prep  Asked her to call if she didn't receive prep and instructions, will be called with arrival time day before

## 2023-01-31 PROBLEM — Z12.11 COLON CANCER SCREENING: Status: RESOLVED | Noted: 2022-12-02 | Resolved: 2023-01-31

## 2023-02-07 ENCOUNTER — HOSPITAL ENCOUNTER (OUTPATIENT)
Dept: GASTROENTEROLOGY | Facility: AMBULATORY SURGERY CENTER | Age: 73
Discharge: HOME | End: 2023-02-07

## 2023-02-07 ENCOUNTER — ANESTHESIA (OUTPATIENT)
Dept: GASTROENTEROLOGY | Facility: AMBULATORY SURGERY CENTER | Age: 73
End: 2023-02-07

## 2023-02-07 ENCOUNTER — ANESTHESIA EVENT (OUTPATIENT)
Dept: GASTROENTEROLOGY | Facility: AMBULATORY SURGERY CENTER | Age: 73
End: 2023-02-07

## 2023-02-07 VITALS
RESPIRATION RATE: 18 BRPM | HEIGHT: 65 IN | OXYGEN SATURATION: 98 % | WEIGHT: 160 LBS | TEMPERATURE: 96.3 F | SYSTOLIC BLOOD PRESSURE: 124 MMHG | BODY MASS INDEX: 26.66 KG/M2 | DIASTOLIC BLOOD PRESSURE: 74 MMHG | HEART RATE: 71 BPM

## 2023-02-07 DIAGNOSIS — Z15.09 BIALLELIC MUTATION OF MUTYH GENE: ICD-10-CM

## 2023-02-07 DIAGNOSIS — Z86.010 HX OF ADENOMATOUS COLONIC POLYPS: ICD-10-CM

## 2023-02-07 RX ORDER — PROPOFOL 10 MG/ML
INJECTION, EMULSION INTRAVENOUS AS NEEDED
Status: DISCONTINUED | OUTPATIENT
Start: 2023-02-07 | End: 2023-02-07

## 2023-02-07 RX ORDER — SODIUM CHLORIDE, SODIUM LACTATE, POTASSIUM CHLORIDE, CALCIUM CHLORIDE 600; 310; 30; 20 MG/100ML; MG/100ML; MG/100ML; MG/100ML
INJECTION, SOLUTION INTRAVENOUS CONTINUOUS PRN
Status: DISCONTINUED | OUTPATIENT
Start: 2023-02-07 | End: 2023-02-07

## 2023-02-07 RX ORDER — LIDOCAINE HYDROCHLORIDE 10 MG/ML
INJECTION, SOLUTION EPIDURAL; INFILTRATION; INTRACAUDAL; PERINEURAL AS NEEDED
Status: DISCONTINUED | OUTPATIENT
Start: 2023-02-07 | End: 2023-02-07

## 2023-02-07 RX ADMIN — PROPOFOL 50 MG: 10 INJECTION, EMULSION INTRAVENOUS at 09:30

## 2023-02-07 RX ADMIN — SODIUM CHLORIDE, SODIUM LACTATE, POTASSIUM CHLORIDE, CALCIUM CHLORIDE: 600; 310; 30; 20 INJECTION, SOLUTION INTRAVENOUS at 09:11

## 2023-02-07 RX ADMIN — PROPOFOL 20 MG: 10 INJECTION, EMULSION INTRAVENOUS at 09:50

## 2023-02-07 RX ADMIN — PROPOFOL 30 MG: 10 INJECTION, EMULSION INTRAVENOUS at 09:33

## 2023-02-07 RX ADMIN — PROPOFOL 30 MG: 10 INJECTION, EMULSION INTRAVENOUS at 09:32

## 2023-02-07 RX ADMIN — LIDOCAINE HYDROCHLORIDE 50 MG: 10 INJECTION, SOLUTION EPIDURAL; INFILTRATION; INTRACAUDAL; PERINEURAL at 09:28

## 2023-02-07 RX ADMIN — PROPOFOL 30 MG: 10 INJECTION, EMULSION INTRAVENOUS at 09:42

## 2023-02-07 RX ADMIN — PROPOFOL 30 MG: 10 INJECTION, EMULSION INTRAVENOUS at 09:38

## 2023-02-07 RX ADMIN — PROPOFOL 30 MG: 10 INJECTION, EMULSION INTRAVENOUS at 09:36

## 2023-02-07 RX ADMIN — PROPOFOL 100 MG: 10 INJECTION, EMULSION INTRAVENOUS at 09:28

## 2023-02-07 RX ADMIN — PROPOFOL 30 MG: 10 INJECTION, EMULSION INTRAVENOUS at 09:46

## 2023-02-07 NOTE — ANESTHESIA PREPROCEDURE EVALUATION
Procedure:  COLONOSCOPY    Relevant Problems   CARDIO   (+) Pure hypercholesterolemia      MUSCULOSKELETAL   (+) Primary osteoarthritis of right knee      NEURO/PSYCH   (+) Hx of adenomatous colonic polyps        Physical Exam    Airway    Mallampati score: II  TM Distance: >3 FB  Neck ROM: full     Dental       Cardiovascular      Pulmonary      Other Findings        Anesthesia Plan  ASA Score- 2     Anesthesia Type- IV sedation with anesthesia with ASA Monitors  Additional Monitors:   Airway Plan:           Plan Factors-Exercise tolerance (METS): >4 METS  Chart reviewed  Patient is not a current smoker  Patient did not smoke on day of surgery  Obstructive sleep apnea risk education given perioperatively  Induction- intravenous  Postoperative Plan-     Informed Consent- Anesthetic plan and risks discussed with patient  I personally reviewed this patient with the CRNA  Discussed and agreed on the Anesthesia Plan with the CRNA           NPO appropriate  Discussed benefits/risks of monitored anesthetic care and discussed providing a dynamic level of mild to deep sedation  Risks include awareness, airway obstruction, aspiration which may necessitate conversion to general anesthesia  All questions answered  Patient understands and wishes to proceed  Anesthesia plan and consent discussed with Carol Maravilla who expressed understanding and agreement  Risks/benefits and alternatives discussed with patient including possible PONV, sore throat, damage to teeth/lips/gums and possibility of rare anesthetic and surgical emergencies

## 2023-02-07 NOTE — H&P
History and Physical - [facility]  Blanka Edawrds 67 y o  female MRN: [id number]                  HPI: Blanka Edwards is a 67y o  year old female who presents for colonoscopy due to history of adenomatous colon polyps and by allelic mutation of MUTYH gene      REVIEW OF SYSTEMS: Per the HPI, and otherwise unremarkable  Historical Information   Past Medical History:   Diagnosis Date   • Ankle fracture, right    • Biallelic mutation of MUTYH gene    • Colon polyp    • Depression     history   • Dysphagia    • H/O impacted cerumen    • Hyperlipidemia     last assessed 02/11/2016   • Laryngopharyngeal reflux (LPR)    • Muscle tension dysphonia    • Throat symptom     tightness     Past Surgical History:   Procedure Laterality Date   • APPENDECTOMY     • CARPAL TUNNEL RELEASE Bilateral    • COLONOSCOPY      2001-tubular adenoma  08/2008-no polyps   07/2011-polyp, diverticulosis, hemorrhoid, Dr Francesco Clark   07/016-diverticulosis, Dr Francesco Clark     • HEMORRHOID SURGERY      multiple ligations of internal hemorrhoids, last assessed 08/16/2016   • HYSTERECTOMY      Fibroids, abdominal hysterectomy age 39   • TONSILLECTOMY     • TUBAL LIGATION       Social History   Social History     Substance and Sexual Activity   Alcohol Use No     Social History     Substance and Sexual Activity   Drug Use No     Social History     Tobacco Use   Smoking Status Never   Smokeless Tobacco Never     Family History   Problem Relation Age of Onset   • Stroke Mother 68        CVA   • Heart disease Father 79        heart attacks/open heart sx   • Diabetes Father 79   • Thyroid disease Daughter 36   • BRCA1 Negative Daughter         inconclusive-positive for colon mutation   • Breast cancer Daughter 52   • No Known Problems Daughter    • No Known Problems Maternal Grandmother    • No Known Problems Maternal Grandfather    • No Known Problems Paternal Grandmother    • No Known Problems Paternal Grandfather    • Esophageal cancer Brother    • Liver cancer Brother    • Suicidality Brother         suicide completion   • Cancer Brother 52        Liver/throat (worked in [employer])   • Thyroid cancer Brother 52   • Prostate cancer Brother 63   • No Known Problems Maternal Aunt    • No Known Problems Maternal Aunt    • No Known Problems Maternal Aunt    • No Known Problems Maternal Aunt    • Bone cancer Paternal Aunt 78        third rib   • No Known Problems Paternal Aunt    • Breast cancer Half-Sister 50   • Coronary artery disease Half-Sister    • Cancer Half-Brother 79        multiple cancers   • Cancer Other 39        multiple cancers when diagnosed   • Melanoma Other         in his 42's   • Stomach cancer Other 64   • Alcohol abuse Neg Hx    • Substance Abuse Neg Hx    • Mental illness Neg Hx    • Depression Neg Hx        Meds/Allergies       Current Outpatient Medications:   •  Calcium Carbonate-Vitamin D 600-200 MG-UNIT TABS  •  Omega-3 Fatty Acids (OMEGA-3 FISH OIL PO)  •  Pediatric Multiple Vitamins (CHEWABLE MULTIPLE VITAMINS PO)    Allergies   Allergen Reactions   • Paroxetine Tremor     Head tremor 2001   • Kiwi Extract - Food Allergy    • Sulfa Antibiotics Rash       Objective     There were no vitals taken for this visit  PHYSICAL EXAM    Gen: NAD  Head: NCAT  CV: RRR  CHEST: Clear  ABD: soft, NT/ND  EXT: no edema      ASSESSMENT/PLAN:  This is a 67y o  year old female here for colonoscopy, and she is stable and optimized for her procedure

## 2023-02-07 NOTE — ANESTHESIA POSTPROCEDURE EVALUATION
Post-Op Assessment Note    CV Status:  Stable  Pain Score: 0    Pain management: adequate     Mental Status:  Sleepy and arousable   Hydration Status:  Euvolemic   PONV Controlled:  Controlled   Airway Patency:  Patent      Post Op Vitals Reviewed: Yes      Staff: Anesthesiologist, CRNA         No notable events documented      /57 (02/07/23 0956)    Temp     Pulse 65 (02/07/23 0956)   Resp 18 (02/07/23 0956)    SpO2 94 % (02/07/23 0956)

## 2023-02-08 ENCOUNTER — PREP FOR PROCEDURE (OUTPATIENT)
Dept: GASTROENTEROLOGY | Facility: CLINIC | Age: 73
End: 2023-02-08

## 2023-02-08 ENCOUNTER — TELEPHONE (OUTPATIENT)
Dept: GASTROENTEROLOGY | Facility: CLINIC | Age: 73
End: 2023-02-08

## 2023-02-08 DIAGNOSIS — Z15.09 BIALLELIC MUTATION OF MUTYH GENE: Primary | ICD-10-CM

## 2023-02-08 NOTE — TELEPHONE ENCOUNTER
OA Questions for EGD  Date: [2/8/23  ]  Screened by: Tremaine Dowell]     Referring Provider: [Dr Gupta]     Pre-Screening: BMI Tineo Damon  ]    Past EGD? If yes - Date: [   ]  Physician/Facility: [   ]  Reason: [   ]     SCHEDULING STAFF: If the patient is over 76years old, please schedule an office visit  ·      Does the patient want to see a gastroenterologist prior to their procedure to discuss any GI symptoms? NO   ·      Has the patient been hospitalized or had abdominal surgery in the past 6 months? NO   ·      Does the patient use supplemental oxygen? NO   ·      Does the patient take [Coumadin], [Lovenox], [Plavix], [Eliquis], [Xarelto], or other blood thinning medication? [No]  ·      Has the patient had a stroke, cardiac event, or stent placed in the past year? [No]     SCHEDULING STAFF: If patient answers NO to the above questions, then schedule the procedure  If patient answers YES to any of the above questions, then schedule an office appointment  ·       If a repeat EGD is belated and patient declines procedure à notify provider

## 2023-02-08 NOTE — TELEPHONE ENCOUNTER
Scheduled pt for EGD/Per discharge instructions from colonoscopy on 2/7/23 Dr Luther May recommended an EGD       Dr Luther May   2/22/23  [facility]  ASC completed  PT would like morning procedure    **PT WOULD ALSO LIKE PHONE CALL FOR PREP**

## 2023-02-09 ENCOUNTER — TELEPHONE (OUTPATIENT)
Dept: GASTROENTEROLOGY | Facility: CLINIC | Age: 73
End: 2023-02-09

## 2023-02-09 NOTE — TELEPHONE ENCOUNTER
Left message for patient reminding her of her egd with Dr Regan Dean on 2/22 at [facility]  She will receive call day prior with arrival time, nothing to eat after midnight, may have clear liquids up to 4 hours prior and she will need a   I did leave her a message about emailing her instructions to her

## 2023-02-09 NOTE — RESULT ENCOUNTER NOTE
The colon polyps were adenomas (precancerous)  Repeat colonoscopy in 1 year  Due to history of MUTYH mutation  This was communicated to my chart

## 2023-02-22 ENCOUNTER — ANESTHESIA EVENT (OUTPATIENT)
Dept: GASTROENTEROLOGY | Facility: AMBULATORY SURGERY CENTER | Age: 73
End: 2023-02-22

## 2023-02-22 ENCOUNTER — ANESTHESIA (OUTPATIENT)
Dept: GASTROENTEROLOGY | Facility: AMBULATORY SURGERY CENTER | Age: 73
End: 2023-02-22

## 2023-02-22 ENCOUNTER — HOSPITAL ENCOUNTER (OUTPATIENT)
Dept: GASTROENTEROLOGY | Facility: AMBULATORY SURGERY CENTER | Age: 73
Discharge: HOME | End: 2023-02-22

## 2023-02-22 ENCOUNTER — TELEPHONE (OUTPATIENT)
Dept: GASTROENTEROLOGY | Facility: CLINIC | Age: 73
End: 2023-02-22

## 2023-02-22 VITALS
BODY MASS INDEX: 27.31 KG/M2 | TEMPERATURE: 96.8 F | HEART RATE: 62 BPM | SYSTOLIC BLOOD PRESSURE: 124 MMHG | OXYGEN SATURATION: 98 % | RESPIRATION RATE: 18 BRPM | WEIGHT: 160 LBS | DIASTOLIC BLOOD PRESSURE: 66 MMHG | HEIGHT: 64 IN

## 2023-02-22 DIAGNOSIS — K20.90 ESOPHAGITIS: ICD-10-CM

## 2023-02-22 DIAGNOSIS — K29.50 ANTRITIS (STOMACH): Primary | ICD-10-CM

## 2023-02-22 DIAGNOSIS — Z15.09 BIALLELIC MUTATION OF MUTYH GENE: ICD-10-CM

## 2023-02-22 RX ORDER — SODIUM CHLORIDE, SODIUM LACTATE, POTASSIUM CHLORIDE, CALCIUM CHLORIDE 600; 310; 30; 20 MG/100ML; MG/100ML; MG/100ML; MG/100ML
125 INJECTION, SOLUTION INTRAVENOUS CONTINUOUS
Status: DISCONTINUED | OUTPATIENT
Start: 2023-02-22 | End: 2023-02-26 | Stop reason: HOSPADM

## 2023-02-22 RX ORDER — FAMOTIDINE 20 MG/1
20 TABLET, FILM COATED ORAL 2 TIMES DAILY
Qty: 60 TABLET | Refills: 1 | Status: SHIPPED | OUTPATIENT
Start: 2023-02-22 | End: 2023-02-24 | Stop reason: SDUPTHER

## 2023-02-22 RX ORDER — SODIUM CHLORIDE, SODIUM LACTATE, POTASSIUM CHLORIDE, CALCIUM CHLORIDE 600; 310; 30; 20 MG/100ML; MG/100ML; MG/100ML; MG/100ML
20 INJECTION, SOLUTION INTRAVENOUS CONTINUOUS
Status: DISCONTINUED | OUTPATIENT
Start: 2023-02-22 | End: 2023-02-26 | Stop reason: HOSPADM

## 2023-02-22 RX ORDER — PROPOFOL 10 MG/ML
INJECTION, EMULSION INTRAVENOUS AS NEEDED
Status: DISCONTINUED | OUTPATIENT
Start: 2023-02-22 | End: 2023-02-22

## 2023-02-22 RX ADMIN — PROPOFOL 100 MG: 10 INJECTION, EMULSION INTRAVENOUS at 11:35

## 2023-02-22 RX ADMIN — SODIUM CHLORIDE, SODIUM LACTATE, POTASSIUM CHLORIDE, CALCIUM CHLORIDE: 600; 310; 30; 20 INJECTION, SOLUTION INTRAVENOUS at 11:44

## 2023-02-22 RX ADMIN — SODIUM CHLORIDE, SODIUM LACTATE, POTASSIUM CHLORIDE, CALCIUM CHLORIDE: 600; 310; 30; 20 INJECTION, SOLUTION INTRAVENOUS at 11:23

## 2023-02-22 RX ADMIN — PROPOFOL 50 MG: 10 INJECTION, EMULSION INTRAVENOUS at 11:37

## 2023-02-22 RX ADMIN — PROPOFOL 50 MG: 10 INJECTION, EMULSION INTRAVENOUS at 11:40

## 2023-02-22 NOTE — TELEPHONE ENCOUNTER
Patients GI provider:  Dr Katarzyna Frias    Number to return call: [phone]    Reason for call: Pt calling for results of colonoscopy done on 2/7/23        Scheduled procedure/appointment date if applicable: no apts or procedures

## 2023-02-22 NOTE — ANESTHESIA POSTPROCEDURE EVALUATION
Post-Op Assessment Note    CV Status:  Stable  Pain Score: 0    Pain management: adequate     Mental Status:  Alert and awake   Hydration Status:  Euvolemic   PONV Controlled:  Controlled   Airway Patency:  Patent      Post Op Vitals Reviewed: Yes      Staff: CRNA         No notable events documented      BP   151/75   Temp  98   Pulse  67   Resp   16   SpO2   98

## 2023-02-22 NOTE — H&P
History and Physical - [facility]  Aaron Smith 67 y o  female MRN: [id number]                  HPI: Aaron Smith is a 67y o  year old female who presents for EGD due to history of MUTYH gene mutation  History of adenomatous colon polyps  REVIEW OF SYSTEMS: Per the HPI, and otherwise unremarkable  Historical Information   Past Medical History:   Diagnosis Date   • Ankle fracture, right    • Biallelic mutation of MUTYH gene    • Colon polyp    • Depression     history   • Dysphagia    • H/O impacted cerumen    • Hyperlipidemia     last assessed 02/11/2016   • Laryngopharyngeal reflux (LPR)    • Muscle tension dysphonia    • Throat symptom     tightness     Past Surgical History:   Procedure Laterality Date   • APPENDECTOMY     • CARPAL TUNNEL RELEASE Bilateral    • COLONOSCOPY      2001-tubular adenoma  08/2008-no polyps   07/2011-polyp, diverticulosis, hemorrhoid, Dr Bre Velazquez   07/016-diverticulosis, Dr Bre Velazquez     • HEMORRHOID SURGERY      multiple ligations of internal hemorrhoids, last assessed 08/16/2016   • HYSTERECTOMY      Fibroids, abdominal hysterectomy age 39   • TONSILLECTOMY     • TUBAL LIGATION       Social History   Social History     Substance and Sexual Activity   Alcohol Use No     Social History     Substance and Sexual Activity   Drug Use No     Social History     Tobacco Use   Smoking Status Never   Smokeless Tobacco Never     Family History   Problem Relation Age of Onset   • Stroke Mother 68        CVA   • Heart disease Father 79        heart attacks/open heart sx   • Diabetes Father 79   • Thyroid disease Daughter 36   • BRCA1 Negative Daughter         inconclusive-positive for colon mutation   • Breast cancer Daughter 52   • No Known Problems Daughter    • No Known Problems Maternal Grandmother    • No Known Problems Maternal Grandfather    • No Known Problems Paternal Grandmother    • No Known Problems Paternal Grandfather    • Esophageal cancer Brother    • Liver cancer Brother    • Suicidality Brother         suicide completion   • Cancer Brother 52        Liver/throat (worked in [employer])   • Thyroid cancer Brother 52   • Prostate cancer Brother 63   • No Known Problems Maternal Aunt    • No Known Problems Maternal Aunt    • No Known Problems Maternal Aunt    • No Known Problems Maternal Aunt    • Bone cancer Paternal Aunt 78        third rib   • No Known Problems Paternal Aunt    • Breast cancer Half-Sister 50   • Coronary artery disease Half-Sister    • Cancer Half-Brother 79        multiple cancers   • Cancer Other 39        multiple cancers when diagnosed   • Melanoma Other         in his 42's   • Stomach cancer Other 64   • Alcohol abuse Neg Hx    • Substance Abuse Neg Hx    • Mental illness Neg Hx    • Depression Neg Hx        Meds/Allergies       Current Outpatient Medications:   •  Calcium Carbonate-Vitamin D 600-200 MG-UNIT TABS  •  Omega-3 Fatty Acids (OMEGA-3 FISH OIL PO)  •  Pediatric Multiple Vitamins (CHEWABLE MULTIPLE VITAMINS PO)    Current Facility-Administered Medications:   •  lactated ringers infusion, 125 mL/hr, Intravenous, Continuous, New Bag at 02/22/23 1123    Allergies   Allergen Reactions   • Paroxetine Tremor     Head tremor 2001   • Kiwi Extract - Food Allergy    • Sulfa Antibiotics Rash       Objective     /66   Pulse 58   Temp (!) 96 8 °F (36 °C) (Temporal)   Resp 18   Ht 5' 4" (1 626 m)   Wt 72 6 kg (160 lb)   SpO2 97%   BMI 27 46 kg/m²       PHYSICAL EXAM    Gen: NAD  Head: NCAT  CV: RRR  CHEST: Clear  ABD: soft, NT/ND  EXT: no edema      ASSESSMENT/PLAN:  This is a 67y o  year old female here for upper endoscopy, and she is stable and optimized for her procedure

## 2023-02-22 NOTE — ANESTHESIA PREPROCEDURE EVALUATION
Procedure:  EGD    Relevant Problems   CARDIO   (+) Pure hypercholesterolemia      MUSCULOSKELETAL   (+) Primary osteoarthritis of right knee      NEURO/PSYCH   (+) Hx of adenomatous colonic polyps        Patient had colonoscopy last week under sedation here at [facility]  No issues reported  Physical Exam    Airway    Mallampati score: I  TM Distance: >3 FB  Neck ROM: full     Dental   No notable dental hx     Cardiovascular      Pulmonary      Other Findings        Anesthesia Plan  ASA Score- 2     Anesthesia Type- IV sedation with anesthesia with ASA Monitors  Additional Monitors:   Airway Plan:           Plan Factors-Exercise tolerance (METS): >4 METS  Chart reviewed  Patient summary reviewed  Patient is not a current smoker  Obstructive sleep apnea risk education given perioperatively  Induction- intravenous  Postoperative Plan-     Informed Consent- Anesthetic plan and risks discussed with patient  I personally reviewed this patient with the CRNA  Discussed and agreed on the Anesthesia Plan with the JORGE Ford

## 2023-02-24 ENCOUNTER — TELEPHONE (OUTPATIENT)
Dept: GASTROENTEROLOGY | Facility: AMBULATORY SURGERY CENTER | Age: 73
End: 2023-02-24

## 2023-02-24 DIAGNOSIS — K29.50 ANTRITIS (STOMACH): ICD-10-CM

## 2023-02-24 DIAGNOSIS — K20.90 ESOPHAGITIS: ICD-10-CM

## 2023-02-24 RX ORDER — FAMOTIDINE 20 MG/1
20 TABLET, FILM COATED ORAL 2 TIMES DAILY
Qty: 60 TABLET | Refills: 0 | Status: SHIPPED | OUTPATIENT
Start: 2023-02-24

## 2023-02-24 NOTE — TELEPHONE ENCOUNTER
Medication Refill Request     Name  famotidine (PEPCID) 20 mg tablet  Dose/Frequency Take 1 tablet (20 mg total) by mouth 2 (two) times a day  Quantity 60 tablet  Verified pharmacy   [x]  Verified ordering Provider   [x]  Does patient have enough for the next 3 days? Yes [] No [x]    New medication for patient - was sent to the wrong pharmacy  Please send to the [facility] in [place]

## 2023-02-24 NOTE — TELEPHONE ENCOUNTER
----- Message from Joanna Kelly RN sent at 2/23/2023  2:06 PM EST -----  Regarding: Pt concern  Hi Dr Odalis Minor  Mrs Raphael Cabezas expressed concern to me that she has not received a call regarding her Colon polyp biopsies from 2/7/23  She did call the office but the person she spoke with could not disclose biopsy results  She fears she will not be notified and she does not have My chart  I told her I would make you aware  Thank you  Joanna Kelly RN    Patient was called today and informed of adenomas repeat colonoscopy 1 year  Additionally a message was placed to my chart which she does not have

## 2023-02-27 NOTE — RESULT ENCOUNTER NOTE
Patient was informed via my chart biopsies were benign  Follow-up in office, repeat EGD 2 to 3 years

## 2023-03-01 ENCOUNTER — APPOINTMENT (OUTPATIENT)
Dept: LAB | Facility: CLINIC | Age: 73
End: 2023-03-01

## 2023-03-01 DIAGNOSIS — E78.00 PURE HYPERCHOLESTEROLEMIA: ICD-10-CM

## 2023-03-01 LAB
ALBUMIN SERPL BCP-MCNC: 3.9 G/DL (ref 3.5–5)
ALP SERPL-CCNC: 59 U/L (ref 34–104)
ALT SERPL W P-5'-P-CCNC: 9 U/L (ref 7–52)
ANION GAP SERPL CALCULATED.3IONS-SCNC: 4 MMOL/L (ref 4–13)
AST SERPL W P-5'-P-CCNC: 13 U/L (ref 13–39)
BILIRUB SERPL-MCNC: 0.74 MG/DL (ref 0.2–1)
BUN SERPL-MCNC: 12 MG/DL (ref 5–25)
CALCIUM SERPL-MCNC: 8.9 MG/DL (ref 8.4–10.2)
CHLORIDE SERPL-SCNC: 102 MMOL/L (ref 96–108)
CHOLEST SERPL-MCNC: 218 MG/DL
CO2 SERPL-SCNC: 30 MMOL/L (ref 21–32)
CREAT SERPL-MCNC: 0.76 MG/DL (ref 0.6–1.3)
GFR SERPL CREATININE-BSD FRML MDRD: 78 ML/MIN/1.73SQ M
GLUCOSE P FAST SERPL-MCNC: 97 MG/DL (ref 65–99)
HDLC SERPL-MCNC: 47 MG/DL
LDLC SERPL CALC-MCNC: 147 MG/DL (ref 0–100)
NONHDLC SERPL-MCNC: 171 MG/DL
POTASSIUM SERPL-SCNC: 4.2 MMOL/L (ref 3.5–5.3)
PROT SERPL-MCNC: 6.4 G/DL (ref 6.4–8.4)
SODIUM SERPL-SCNC: 136 MMOL/L (ref 135–147)
TRIGL SERPL-MCNC: 118 MG/DL

## 2023-03-03 ENCOUNTER — TELEPHONE (OUTPATIENT)
Dept: INTERNAL MEDICINE CLINIC | Facility: CLINIC | Age: 73
End: 2023-03-03

## 2023-03-03 NOTE — TELEPHONE ENCOUNTER
Do we have information on cholesterol diet that we can mail home along with the lab results   Patient has been keeping food diary for last month and wants to know if dietician can take a look at that  Went through [facility] before but it is too expensive  Just wants to make sure its nothing she is doing wrong     Is this something the [facility] ?

## 2023-03-03 NOTE — TELEPHONE ENCOUNTER
Lab results: Your cholesterol has improved but is still not at goal  Bad cholesterol,LDL still high at 147, we want that below 100  The rest of your labs were normal     If you change your mind about starting cholesterol medication, let me know      Please schedule annual physical in August

## 2023-03-03 NOTE — TELEPHONE ENCOUNTER
We sent you a low cholesterol/fat diet a few months ago, it is similar  Would you like that mailed to you again? Yes, you can use the one in [employer]

## 2023-03-27 ENCOUNTER — TELEPHONE (OUTPATIENT)
Dept: INTERNAL MEDICINE CLINIC | Facility: CLINIC | Age: 73
End: 2023-03-27

## 2023-03-27 DIAGNOSIS — K29.50 ANTRITIS (STOMACH): ICD-10-CM

## 2023-03-27 DIAGNOSIS — K20.90 ESOPHAGITIS: ICD-10-CM

## 2023-03-27 RX ORDER — FAMOTIDINE 20 MG/1
TABLET, FILM COATED ORAL
Qty: 60 TABLET | Refills: 0 | Status: SHIPPED | OUTPATIENT
Start: 2023-03-27

## 2023-03-27 NOTE — TELEPHONE ENCOUNTER
Pt has right shoulder pain from the front to the back of the shoulder x 2 months  Pain wakes her up at night if she sleeps on the right side  Tender to touch  No known injury  She is able to move her arm up and down  Patient would like to get an x-ray?

## 2023-03-27 NOTE — TELEPHONE ENCOUNTER
Recommend office visit to have this evaluated  It may be the shoulder or neck, arthritis or tendonitis  Can offer appt this week with me or Laura Jones

## 2023-03-31 ENCOUNTER — OFFICE VISIT (OUTPATIENT)
Dept: INTERNAL MEDICINE CLINIC | Facility: CLINIC | Age: 73
End: 2023-03-31

## 2023-03-31 VITALS
SYSTOLIC BLOOD PRESSURE: 150 MMHG | DIASTOLIC BLOOD PRESSURE: 100 MMHG | BODY MASS INDEX: 26.98 KG/M2 | TEMPERATURE: 96.1 F | HEIGHT: 64 IN | WEIGHT: 158 LBS | HEART RATE: 94 BPM | OXYGEN SATURATION: 97 %

## 2023-03-31 DIAGNOSIS — K59.09 OTHER CONSTIPATION: ICD-10-CM

## 2023-03-31 DIAGNOSIS — M25.511 ACUTE PAIN OF RIGHT SHOULDER: Primary | ICD-10-CM

## 2023-03-31 DIAGNOSIS — R03.0 ELEVATED BLOOD-PRESSURE READING WITHOUT DIAGNOSIS OF HYPERTENSION: ICD-10-CM

## 2023-03-31 DIAGNOSIS — E78.00 PURE HYPERCHOLESTEROLEMIA: ICD-10-CM

## 2023-03-31 NOTE — PROGRESS NOTES
Name: Onur Aguilera      : 1950      MRN: [id number]  Encounter Provider: Alonso Camarillo MD  Encounter Date: 3/31/2023   Encounter department: [address] [facility]     1  Acute pain of right shoulder  Comments:  Suspect tendonitis  Adjust sleeping position  May apply ice or heat prn  Do stretching exercises for neck  2  Other constipation  Comments:  Increase daily water intake  Discussed high fiber diet  May take Metamucil prn     3  Elevated blood-pressure reading without diagnosis of hypertension  Comments:  Repeat BP remains elevated, monitor BP at home  4  Pure hypercholesterolemia  Assessment & Plan:  Lipids improved but remain elevated  Again, discussed risk, recommend statin  She will consider this at next visit  Follow up as scheduled or as needed  Subjective      She sleeps on her stomach because she has tender points on her back  In the past 2 months or so, she noticed right shoulder pain  Pain worse with certain movements, no falls, injury or swelling  She has occasional upper neck pain  No headache, arm weakness or numbness  No chest pain or pressure, no shortness of breath  She also complains of occasional cloudy urine with dysuria  It would last only a few hours and it would resolve on its own  She reports mild LLQ pain this morning, had small hard stools  She drinks green tea and mostly water  She reports feeling stressed recently due to her husbands health issues  She is watching her diet, happy her cholesterol went down  Review of Systems   Constitutional: Negative for activity change and appetite change  Respiratory: Negative for cough and shortness of breath  Cardiovascular: Negative for chest pain  Gastrointestinal: Positive for constipation  Negative for abdominal pain  Genitourinary: Negative for dysuria  Musculoskeletal: Positive for arthralgias     Neurological: Negative for dizziness, "weakness, light-headedness, numbness and headaches  Psychiatric/Behavioral: Negative for sleep disturbance  Current Outpatient Medications on File Prior to Visit   Medication Sig   • famotidine (PEPCID) 20 mg tablet TAKE 1 TABLET BY MOUTH TWICE A DAY   • Calcium Carbonate-Vitamin D 600-200 MG-UNIT TABS Take by mouth   • Omega-3 Fatty Acids (OMEGA-3 FISH OIL PO) Take by mouth   • Pediatric Multiple Vitamins (CHEWABLE MULTIPLE VITAMINS PO) Take 2 tablets by mouth daily       Objective     /100   Pulse 94   Temp (!) 96 1 °F (35 6 °C)   Ht 5' 4\" (1 626 m)   Wt 71 7 kg (158 lb)   SpO2 97%   BMI 27 12 kg/m²     Physical Exam  Vitals and nursing note reviewed  Constitutional:       General: She is not in acute distress  Appearance: Normal appearance  She is not ill-appearing  HENT:      Head: Normocephalic and atraumatic  Mouth/Throat:      Mouth: Mucous membranes are moist    Eyes:      Pupils: Pupils are equal, round, and reactive to light  Pulmonary:      Effort: Pulmonary effort is normal  No respiratory distress  Musculoskeletal:      Right shoulder: No tenderness or bony tenderness  Normal range of motion  Left shoulder: Normal       Cervical back: Spasms present  No tenderness or bony tenderness  No pain with movement  Back:       Comments: (+) pain at end range with R adduction and ER   Neurological:      General: No focal deficit present  Mental Status: She is alert and oriented to person, place, and time  Psychiatric:         Mood and Affect: Mood normal          Behavior: Behavior normal           Lyndsay Ley MD     Lab results reviewed with patient      "

## 2023-03-31 NOTE — ASSESSMENT & PLAN NOTE
Lipids improved but remain elevated  Again, discussed risk, recommend statin  She will consider this at next visit

## 2023-07-24 ENCOUNTER — OFFICE VISIT (OUTPATIENT)
Dept: INTERNAL MEDICINE CLINIC | Facility: CLINIC | Age: 73
End: 2023-07-24
Payer: MEDICARE

## 2023-07-24 ENCOUNTER — HOSPITAL ENCOUNTER (OUTPATIENT)
Dept: RADIOLOGY | Facility: HOSPITAL | Age: 73
Discharge: HOME | End: 2023-07-24
Payer: MEDICARE

## 2023-07-24 VITALS
SYSTOLIC BLOOD PRESSURE: 130 MMHG | TEMPERATURE: 96.9 F | WEIGHT: 154 LBS | DIASTOLIC BLOOD PRESSURE: 80 MMHG | HEIGHT: 64 IN | BODY MASS INDEX: 26.29 KG/M2 | HEART RATE: 73 BPM | OXYGEN SATURATION: 98 %

## 2023-07-24 DIAGNOSIS — L23.7 POISON IVY DERMATITIS: Primary | ICD-10-CM

## 2023-07-24 DIAGNOSIS — K64.9 HEMORRHOIDS, UNSPECIFIED HEMORRHOID TYPE: ICD-10-CM

## 2023-07-24 DIAGNOSIS — M17.11 PRIMARY OSTEOARTHRITIS OF RIGHT KNEE: ICD-10-CM

## 2023-07-24 PROCEDURE — 99214 OFFICE O/P EST MOD 30 MIN: CPT | Performed by: NURSE PRACTITIONER

## 2023-07-24 PROCEDURE — 73562 X-RAY EXAM OF KNEE 3: CPT

## 2023-07-24 RX ORDER — PREDNISONE 10 MG/1
TABLET ORAL
Qty: 50 TABLET | Refills: 0 | Status: SHIPPED | OUTPATIENT
Start: 2023-07-24

## 2023-07-24 NOTE — PROGRESS NOTES
Name: Kaushal Castro      : 1950      MRN: [id number]  Encounter Provider: CHRISTINA Collins  Encounter Date: 2023   Encounter department: [address] [facility]     1. Poison ivy dermatitis  -     predniSONE 10 mg tablet; Take 6 tablets daily for 2 days then 5 tablets daily for 2 days then 4 tablets daily for 2 days then 3 tablets daily for 2 days then 2 tablets daily for 2 days then 1 tablet daily for 2 days then stop    2. Primary osteoarthritis of right knee  -     XR knee 3 vw right non injury; Future; Expected date: 2023    3. Hemorrhoids, unspecified hemorrhoid type  Comments:  follow a high fiber diet, stay well hydrated. use tucks pads as needed. follow up with GI if symptoms persist.            Zaida Huggins is here today with complaints of a rash on her neck and right knee swelling  The rash started about a week ago. It started on her neck. Since then it has spread to her right arm, abdomen and lower back. It is very itchy. She has been using aloe and hydrocortisone cream with minimal relief. She has arthritis in her right knee. She's noticed over the last year she's had intermittent swelling. The last few weeks this has been worse. The area has been warm to touch. It is painful when she ambulates. She denies any redness. A few weeks ago she felt a lump in her rectum. It became bigger and more painful. Since then the pain has resolved and it has decreased in size. She denies any rectal itching or bleeding. Review of Systems   Constitutional: Negative for activity change, appetite change and fever. Respiratory: Negative for shortness of breath. Cardiovascular: Negative for chest pain and leg swelling. Musculoskeletal: Positive for arthralgias and joint swelling. Skin: Positive for rash. Neurological: Negative for dizziness, light-headedness and headaches.        Current Outpatient Medications on File Prior to Visit   Medication Sig   • Calcium Carbonate-Vitamin D 600-200 MG-UNIT TABS Take by mouth   • famotidine (PEPCID) 20 mg tablet TAKE 1 TABLET BY MOUTH TWICE A DAY   • Omega-3 Fatty Acids (OMEGA-3 FISH OIL PO) Take by mouth   • Pediatric Multiple Vitamins (CHEWABLE MULTIPLE VITAMINS PO) Take 2 tablets by mouth daily       Objective     /80   Pulse 73   Temp (!) 96.9 °F (36.1 °C)   Ht 5' 4" (1.626 m)   Wt 69.9 kg (154 lb)   SpO2 98%   BMI 26.43 kg/m²     Physical Exam  Vitals reviewed. Constitutional:       Appearance: Normal appearance. HENT:      Head: Normocephalic and atraumatic. Eyes:      Conjunctiva/sclera: Conjunctivae normal.   Pulmonary:      Effort: Pulmonary effort is normal.   Genitourinary:      Musculoskeletal:      Right knee: Swelling present. Normal range of motion. No tenderness. Right lower leg: No edema. Left lower leg: No edema. Skin:         Neurological:      Mental Status: She is alert and oriented to person, place, and time.    Psychiatric:         Mood and Affect: Mood normal.         Behavior: Behavior normal.       CHRISTINA Rahman

## 2023-08-03 ENCOUNTER — APPOINTMENT (OUTPATIENT)
Dept: LAB | Facility: CLINIC | Age: 73
End: 2023-08-03
Payer: MEDICARE

## 2023-08-03 DIAGNOSIS — M85.89 OSTEOPENIA OF MULTIPLE SITES: ICD-10-CM

## 2023-08-03 DIAGNOSIS — E78.00 PURE HYPERCHOLESTEROLEMIA: ICD-10-CM

## 2023-08-03 DIAGNOSIS — E55.9 VITAMIN D DEFICIENCY: ICD-10-CM

## 2023-08-03 LAB
25(OH)D3 SERPL-MCNC: 41.1 NG/ML (ref 30–100)
ALBUMIN SERPL BCP-MCNC: 3.7 G/DL (ref 3.5–5)
ALP SERPL-CCNC: 59 U/L (ref 34–104)
ALT SERPL W P-5'-P-CCNC: 11 U/L (ref 7–52)
ANION GAP SERPL CALCULATED.3IONS-SCNC: 5 MMOL/L
AST SERPL W P-5'-P-CCNC: 12 U/L (ref 13–39)
BASOPHILS # BLD AUTO: 0.03 THOUSANDS/ÂΜL (ref 0–0.1)
BASOPHILS NFR BLD AUTO: 0 % (ref 0–1)
BILIRUB SERPL-MCNC: 0.86 MG/DL (ref 0.2–1)
BUN SERPL-MCNC: 17 MG/DL (ref 5–25)
CALCIUM SERPL-MCNC: 8.5 MG/DL (ref 8.4–10.2)
CHLORIDE SERPL-SCNC: 98 MMOL/L (ref 96–108)
CHOLEST SERPL-MCNC: 184 MG/DL
CO2 SERPL-SCNC: 29 MMOL/L (ref 21–32)
CREAT SERPL-MCNC: 0.75 MG/DL (ref 0.6–1.3)
EOSINOPHIL # BLD AUTO: 0.08 THOUSAND/ÂΜL (ref 0–0.61)
EOSINOPHIL NFR BLD AUTO: 1 % (ref 0–6)
ERYTHROCYTE [DISTWIDTH] IN BLOOD BY AUTOMATED COUNT: 13.1 % (ref 11.6–15.1)
GFR SERPL CREATININE-BSD FRML MDRD: 79 ML/MIN/1.73SQ M
GLUCOSE P FAST SERPL-MCNC: 87 MG/DL (ref 65–99)
HCT VFR BLD AUTO: 38.7 % (ref 34.8–46.1)
HDLC SERPL-MCNC: 57 MG/DL
HGB BLD-MCNC: 12.6 G/DL (ref 11.5–15.4)
IMM GRANULOCYTES # BLD AUTO: 0.19 THOUSAND/UL (ref 0–0.2)
IMM GRANULOCYTES NFR BLD AUTO: 2 % (ref 0–2)
LDLC SERPL CALC-MCNC: 105 MG/DL (ref 0–100)
LYMPHOCYTES # BLD AUTO: 3.44 THOUSANDS/ÂΜL (ref 0.6–4.47)
LYMPHOCYTES NFR BLD AUTO: 29 % (ref 14–44)
MCH RBC QN AUTO: 30.3 PG (ref 26.8–34.3)
MCHC RBC AUTO-ENTMCNC: 32.6 G/DL (ref 31.4–37.4)
MCV RBC AUTO: 93 FL (ref 82–98)
MONOCYTES # BLD AUTO: 0.92 THOUSAND/ÂΜL (ref 0.17–1.22)
MONOCYTES NFR BLD AUTO: 8 % (ref 4–12)
NEUTROPHILS # BLD AUTO: 7.16 THOUSANDS/ÂΜL (ref 1.85–7.62)
NEUTS SEG NFR BLD AUTO: 60 % (ref 43–75)
NONHDLC SERPL-MCNC: 127 MG/DL
NRBC BLD AUTO-RTO: 0 /100 WBCS
PLATELET # BLD AUTO: 278 THOUSANDS/UL (ref 149–390)
PMV BLD AUTO: 10 FL (ref 8.9–12.7)
POTASSIUM SERPL-SCNC: 3.8 MMOL/L (ref 3.5–5.3)
PROT SERPL-MCNC: 6 G/DL (ref 6.4–8.4)
RBC # BLD AUTO: 4.16 MILLION/UL (ref 3.81–5.12)
SODIUM SERPL-SCNC: 132 MMOL/L (ref 135–147)
TRIGL SERPL-MCNC: 108 MG/DL
TSH SERPL DL<=0.05 MIU/L-ACNC: 3.17 UIU/ML (ref 0.45–4.5)
WBC # BLD AUTO: 11.82 THOUSAND/UL (ref 4.31–10.16)

## 2023-08-03 PROCEDURE — 82306 VITAMIN D 25 HYDROXY: CPT

## 2023-08-03 PROCEDURE — 80061 LIPID PANEL: CPT

## 2023-08-03 PROCEDURE — 36415 COLL VENOUS BLD VENIPUNCTURE: CPT

## 2023-08-03 PROCEDURE — 80053 COMPREHEN METABOLIC PANEL: CPT

## 2023-08-03 PROCEDURE — 85025 COMPLETE CBC W/AUTO DIFF WBC: CPT

## 2023-08-03 PROCEDURE — 84443 ASSAY THYROID STIM HORMONE: CPT

## 2023-08-04 ENCOUNTER — TELEPHONE (OUTPATIENT)
Dept: INTERNAL MEDICINE CLINIC | Facility: CLINIC | Age: 73
End: 2023-08-04

## 2023-08-04 DIAGNOSIS — M17.11 PRIMARY OSTEOARTHRITIS OF RIGHT KNEE: Primary | ICD-10-CM

## 2023-08-07 ENCOUNTER — OFFICE VISIT (OUTPATIENT)
Dept: GASTROENTEROLOGY | Facility: CLINIC | Age: 73
End: 2023-08-07
Payer: MEDICARE

## 2023-08-07 VITALS
HEART RATE: 69 BPM | HEIGHT: 64 IN | DIASTOLIC BLOOD PRESSURE: 71 MMHG | BODY MASS INDEX: 26.12 KG/M2 | WEIGHT: 153 LBS | SYSTOLIC BLOOD PRESSURE: 135 MMHG

## 2023-08-07 DIAGNOSIS — K64.5 THROMBOSED EXTERNAL HEMORRHOID: Primary | ICD-10-CM

## 2023-08-07 DIAGNOSIS — Z15.09 BIALLELIC MUTATION OF MUTYH GENE: ICD-10-CM

## 2023-08-07 DIAGNOSIS — Z12.11 COLON CANCER SCREENING: ICD-10-CM

## 2023-08-07 DIAGNOSIS — K29.50 ANTRITIS (STOMACH): ICD-10-CM

## 2023-08-07 DIAGNOSIS — K20.90 ESOPHAGITIS: ICD-10-CM

## 2023-08-07 DIAGNOSIS — Z86.010 HX OF ADENOMATOUS COLONIC POLYPS: ICD-10-CM

## 2023-08-07 PROCEDURE — 99213 OFFICE O/P EST LOW 20 MIN: CPT | Performed by: INTERNAL MEDICINE

## 2023-08-07 NOTE — PROGRESS NOTES
[facility] - Outpatient Follow-up Note  Yosvany Whiting 67 y.o. female MRN: [id number]  Encounter: [id number]          ASSESSMENT AND PLAN:      1. Thrombosed external hemorrhoid      2. Antritis (stomach)  Treated with Pepcid resolved no H. pylori    3. Esophagitis  Treated with Pepcid now conservatively no Wilkins's    4. Biallelic mutation of MUTYH gene  EGD February 2025, colonoscopy February 2024    5. Hx of adenomatous colonic polyps  As above    6. Colon cancer screening  As above, follow-up 1 year unless otherwise indicated or symptoms return    ______________________________________________________________________    SUBJECTIVE: Very pleasant white female presents for evaluation of hemorrhoidal tissue. Had an enlarged external hemorrhoid somewhat tender this has since resolved. No blood currently no pain and asymptomatic. She denies any epigastric discomfort she denies any reflux symptoms. She does have underlying by allelic mutation of the mutyh gene. REVIEW OF SYSTEMS IS OTHERWISE NEGATIVE. Historical Information   Past Medical History:   Diagnosis Date   • Ankle fracture, right    • Biallelic mutation of MUTYH gene    • Colon polyp    • Depression     history   • Dysphagia    • H/O impacted cerumen    • Hyperlipidemia     last assessed 02/11/2016   • Laryngopharyngeal reflux (LPR)    • Muscle tension dysphonia    • Throat symptom     tightness     Past Surgical History:   Procedure Laterality Date   • APPENDECTOMY     • CARPAL TUNNEL RELEASE Bilateral    • COLONOSCOPY      2001-tubular adenoma. 08/2008-no polyps.  07/2011-polyp, diverticulosis, hemorrhoid, Dr. Joelle Isaac.  07/016-diverticulosis, Dr. Joelle Isaac.    • HEMORRHOID SURGERY      multiple ligations of internal hemorrhoids, last assessed 08/16/2016   • HYSTERECTOMY      Fibroids, abdominal hysterectomy age 39   • TONSILLECTOMY     • TUBAL LIGATION       Social History   Social History     Substance and Sexual Activity   Alcohol Use No     Social History     Substance and Sexual Activity   Drug Use No     Social History     Tobacco Use   Smoking Status Never   Smokeless Tobacco Never     Family History   Problem Relation Age of Onset   • Stroke Mother 68        CVA   • Heart disease Father 79        heart attacks/open heart sx   • Diabetes Father 79   • Thyroid disease Daughter 36   • BRCA1 Negative Daughter         inconclusive-positive for colon mutation   • Breast cancer Daughter 52   • No Known Problems Daughter    • No Known Problems Maternal Grandmother    • No Known Problems Maternal Grandfather    • No Known Problems Paternal Grandmother    • No Known Problems Paternal Grandfather    • Esophageal cancer Brother    • Liver cancer Brother    • Suicidality Brother         suicide completion   • Cancer Brother 52        Liver/throat (worked in [employer])   • Thyroid cancer Brother 52   • Prostate cancer Brother 61   • No Known Problems Maternal Aunt    • No Known Problems Maternal Aunt    • No Known Problems Maternal Aunt    • No Known Problems Maternal Aunt    • Bone cancer Paternal Aunt 78        third rib   • No Known Problems Paternal Aunt    • Breast cancer Half-Sister 50   • Coronary artery disease Half-Sister    • Cancer Half-Brother 79        multiple cancers   • Cancer Other 39        multiple cancers when diagnosed   • Melanoma Other         in his 42's   • Stomach cancer Other 64   • Alcohol abuse Neg Hx    • Substance Abuse Neg Hx    • Mental illness Neg Hx    • Depression Neg Hx        Meds/Allergies       Current Outpatient Medications:   •  Calcium Carbonate-Vitamin D 600-200 MG-UNIT TABS  •  Omega-3 Fatty Acids (OMEGA-3 FISH OIL PO)  •  Pediatric Multiple Vitamins (CHEWABLE MULTIPLE VITAMINS PO)  •  famotidine (PEPCID) 20 mg tablet  •  predniSONE 10 mg tablet    Allergies   Allergen Reactions   • Paroxetine Tremor     Head tremor 2001   • Kiwi Extract - Food Allergy    • Sulfa Antibiotics Rash Objective     Blood pressure 135/71, pulse 69, height 5' 4" (1.626 m), weight 69.4 kg (153 lb), not currently breastfeeding. Body mass index is 26.26 kg/m². PHYSICAL EXAM:      General Appearance:   Alert, cooperative, no distress   HEENT:   Normocephalic, atraumatic, anicteric.     Neck:  Supple, symmetrical, trachea midline   Lungs:   Clear to auscultation bilaterally; no rales, rhonchi or wheezing; respirations unlabored    Heart[de-identified]   Regular rate and rhythm; no murmur, rub, or gallop. Abdomen:   Soft, non-tender, non-distended; normal bowel sounds; no masses, no organomegaly    Genitalia:   Deferred    Rectal:    Internal tags. On the right side at 12:00 in the left lateral decubitus position appears as though have a resolved thrombosed hemorrhoid. No tenderness soft, rectal exam no masses   Extremities:  No cyanosis, clubbing or edema    Pulses:  2+ and symmetric    Skin:  No jaundice, rashes, or lesions    Lymph nodes:  No palpable cervical lymphadenopathy        Lab Results:   No visits with results within 1 Day(s) from this visit.    Latest known visit with results is:   Appointment on 08/03/2023   Component Date Value   • WBC 08/03/2023 11.82 (H)    • RBC 08/03/2023 4.16    • Hemoglobin 08/03/2023 12.6    • Hematocrit 08/03/2023 38.7    • MCV 08/03/2023 93    • MCH 08/03/2023 30.3    • MCHC 08/03/2023 32.6    • RDW 08/03/2023 13.1    • MPV 08/03/2023 10.0    • Platelets 74/10/7549 278    • nRBC 08/03/2023 0    • Neutrophils Relative 08/03/2023 60    • Immat GRANS % 08/03/2023 2    • Lymphocytes Relative 08/03/2023 29    • Monocytes Relative 08/03/2023 8    • Eosinophils Relative 08/03/2023 1    • Basophils Relative 08/03/2023 0    • Neutrophils Absolute 08/03/2023 7.16    • Immature Grans Absolute 08/03/2023 0.19    • Lymphocytes Absolute 08/03/2023 3.44    • Monocytes Absolute 08/03/2023 0.92    • Eosinophils Absolute 08/03/2023 0.08    • Basophils Absolute 08/03/2023 0.03    • Sodium 08/03/2023 132 (L)    • Potassium 08/03/2023 3.8    • Chloride 08/03/2023 98    • CO2 08/03/2023 29    • ANION GAP 08/03/2023 5    • BUN 08/03/2023 17    • Creatinine 08/03/2023 0.75    • Glucose, Fasting 08/03/2023 87    • Calcium 08/03/2023 8.5    • AST 08/03/2023 12 (L)    • ALT 08/03/2023 11    • Alkaline Phosphatase 08/03/2023 59    • Total Protein 08/03/2023 6.0 (L)    • Albumin 08/03/2023 3.7    • Total Bilirubin 08/03/2023 0.86    • eGFR 08/03/2023 79    • Cholesterol 08/03/2023 184    • Triglycerides 08/03/2023 108    • HDL, Direct 08/03/2023 57    • LDL Calculated 08/03/2023 105 (H)    • Non-HDL-Chol (CHOL-HDL) 08/03/2023 127    • TSH 3RD GENERATON 08/03/2023 3.167    • Vit D, 25-Hydroxy 08/03/2023 41.1          Radiology Results:   XR knee 3 vw right non injury    Result Date: 8/3/2023  Narrative: RIGHT KNEE INDICATION:   M17.11: Unilateral primary osteoarthritis, right knee. COMPARISON: 8/20/2020 VIEWS:  XR KNEE 3 VW RIGHT NON INJURY FINDINGS: There is no acute fracture or dislocation. There is a moderate joint effusion. There is mild  patellofemoral, moderate medial compartment, moderate lateral compartment osteoarthrosis manifested by joint space narrowing, marginal osteophyte formation . No lytic or blastic osseous lesion. Soft tissues are unremarkable. Impression: No acute osseous abnormality. Joint effusion. Degenerative changes as described.  Workstation performed: [id number]

## 2023-08-07 NOTE — TELEPHONE ENCOUNTER
Lab results:    Sodium was a bit low, are you drinking enough water daily? Protein also a bit low. Do you eat protein with every meal? If not, please do so. Cholesterol has improved but still elevated. White count was a bit high, were you having any cough or cold symptoms when you did the blood work? The rest of your labs were normal.    I will see you at your appointment at the end of the month. Staff: may print lab results and mail to her.

## 2023-10-10 ENCOUNTER — HOSPITAL ENCOUNTER (OUTPATIENT)
Dept: RADIOLOGY | Age: 73
Discharge: HOME | End: 2023-10-10
Payer: MEDICARE

## 2023-10-10 DIAGNOSIS — Z12.31 ENCOUNTER FOR SCREENING MAMMOGRAM FOR MALIGNANT NEOPLASM OF BREAST: ICD-10-CM

## 2023-10-10 PROCEDURE — 77063 BREAST TOMOSYNTHESIS BI: CPT

## 2023-10-10 PROCEDURE — 77067 SCR MAMMO BI INCL CAD: CPT

## 2023-11-15 ENCOUNTER — PREP FOR PROCEDURE (OUTPATIENT)
Age: 73
End: 2023-11-15

## 2023-11-15 ENCOUNTER — TELEPHONE (OUTPATIENT)
Age: 73
End: 2023-11-15

## 2023-11-15 DIAGNOSIS — Z86.010 HX OF ADENOMATOUS COLONIC POLYPS: Primary | ICD-10-CM

## 2023-11-15 DIAGNOSIS — Z15.09 BIALLELIC MUTATION OF MUTYH GENE: ICD-10-CM

## 2023-11-15 RX ORDER — POLYETHYLENE GLYCOL-3350 AND ELECTROLYTES 236; 6.74; 5.86; 2.97; 22.74 G/274.31G; G/274.31G; G/274.31G; G/274.31G; G/274.31G
4 POWDER, FOR SOLUTION ORAL ONCE
Qty: 4000 ML | Refills: 0 | Status: SHIPPED | OUTPATIENT
Start: 2023-11-15 | End: 2023-11-15

## 2023-11-15 NOTE — TELEPHONE ENCOUNTER
11/15/23  Screened by: Claudia Stone    Referring Provider Sameera Stanley    Pre- Screening: There is no height or weight on file to calculate BMI. Has patient been referred for a routine screening Colonoscopy? yes  Is the patient between 43-73 years old? yes      Previous Colonoscopy yes   If yes:    Date: 02/07/2022    Facility: [facility]    Reason: Hx of polyps      SCHEDULING STAFF: If the patient is between 45yrs-49yrs, please advise patient to confirm benefits/coverage with their insurance company for a routine screening colonoscopy, some insurance carriers will only cover at [facility] or [facility]. If the patient is over 66years old, please schedule an office visit. Does the patient want to see a Gastroenterologist prior to their procedure OR are they having any GI symptoms? no    Has the patient been hospitalized or had abdominal surgery in the past 6 months? no    Does the patient use supplemental oxygen? no    Does the patient take Coumadin, Lovenox, Plavix, Elliquis, Xarelto, or other blood thinning medication? no    Has the patient had a stroke, cardiac event, or stent placed in the past year? no    SCHEDULING STAFF: If patient answers NO to above questions, then schedule procedure. If patient answers YES to above questions, then schedule office appointment. PASSED OA    If patient is between 45yrs - 49yrs, please advise patient that we will have to confirm benefits & coverage with their insurance company for a routine screening colonoscopy.

## 2023-11-15 NOTE — TELEPHONE ENCOUNTER
Called and ammy to pt and informed that Dr. Solomon Valladares does prefer the Golytely preparation. I informed that a script would be sent into her [facility] pharmacy and that I would be mailing those instructions.

## 2023-11-15 NOTE — TELEPHONE ENCOUNTER
Scheduled date of colonoscopy (as of today): 2/16/24    Physician performing colonoscopy:WILLEM    Location of colonoscopy: [facility]    Bowel prep reviewed with patient: JAMES/DUL    PASSED OA  1 year recall  Hx of Polyps    *Transfer care from Dr. Abbie Swanson to Dr. Sydnee Christian    **PT Jesse Mcmanus

## 2024-01-18 ENCOUNTER — HOSPITAL ENCOUNTER (OUTPATIENT)
Dept: RADIOLOGY | Age: 74
Discharge: HOME | End: 2024-01-18
Payer: MEDICARE

## 2024-01-18 DIAGNOSIS — Z78.0 POSTMENOPAUSAL STATUS (AGE-RELATED) (NATURAL): ICD-10-CM

## 2024-01-18 PROCEDURE — 77080 DXA BONE DENSITY AXIAL: CPT

## 2024-02-23 ENCOUNTER — TELEPHONE (OUTPATIENT)
Dept: GASTROENTEROLOGY | Facility: CLINIC | Age: 74
End: 2024-02-23

## 2024-02-23 NOTE — TELEPHONE ENCOUNTER
Spoke to pt confirming pt's colonoscopy scheduled on 3/1/24 at  with Dr Diaz.  Informed  would be calling the day prior with the arrival time.  Pt has instructions and did not have any questions at this time.

## 2024-02-29 RX ORDER — SODIUM CHLORIDE, SODIUM LACTATE, POTASSIUM CHLORIDE, CALCIUM CHLORIDE 600; 310; 30; 20 MG/100ML; MG/100ML; MG/100ML; MG/100ML
75 INJECTION, SOLUTION INTRAVENOUS CONTINUOUS
Status: CANCELLED | OUTPATIENT
Start: 2024-02-29

## 2024-03-01 ENCOUNTER — ANESTHESIA EVENT (OUTPATIENT)
Dept: GASTROENTEROLOGY | Facility: HOSPITAL | Age: 74
End: 2024-03-01

## 2024-03-01 ENCOUNTER — ANESTHESIA (OUTPATIENT)
Dept: GASTROENTEROLOGY | Facility: HOSPITAL | Age: 74
End: 2024-03-01

## 2024-03-01 ENCOUNTER — HOSPITAL ENCOUNTER (OUTPATIENT)
Dept: GASTROENTEROLOGY | Facility: HOSPITAL | Age: 74
Setting detail: OUTPATIENT SURGERY
End: 2024-03-01
Attending: INTERNAL MEDICINE
Payer: MEDICARE

## 2024-03-01 VITALS
DIASTOLIC BLOOD PRESSURE: 63 MMHG | SYSTOLIC BLOOD PRESSURE: 123 MMHG | RESPIRATION RATE: 16 BRPM | HEIGHT: 64 IN | TEMPERATURE: 96.6 F | WEIGHT: 153 LBS | BODY MASS INDEX: 26.12 KG/M2 | HEART RATE: 56 BPM | OXYGEN SATURATION: 98 %

## 2024-03-01 DIAGNOSIS — Z15.09 BIALLELIC MUTATION OF MUTYH GENE: ICD-10-CM

## 2024-03-01 DIAGNOSIS — Z86.010 HX OF ADENOMATOUS COLONIC POLYPS: ICD-10-CM

## 2024-03-01 PROCEDURE — 88305 TISSUE EXAM BY PATHOLOGIST: CPT | Performed by: STUDENT IN AN ORGANIZED HEALTH CARE EDUCATION/TRAINING PROGRAM

## 2024-03-01 RX ORDER — SODIUM CHLORIDE, SODIUM LACTATE, POTASSIUM CHLORIDE, CALCIUM CHLORIDE 600; 310; 30; 20 MG/100ML; MG/100ML; MG/100ML; MG/100ML
INJECTION, SOLUTION INTRAVENOUS CONTINUOUS PRN
Status: DISCONTINUED | OUTPATIENT
Start: 2024-03-01 | End: 2024-03-01

## 2024-03-01 RX ORDER — PROPOFOL 10 MG/ML
INJECTION, EMULSION INTRAVENOUS AS NEEDED
Status: DISCONTINUED | OUTPATIENT
Start: 2024-03-01 | End: 2024-03-01

## 2024-03-01 RX ADMIN — PROPOFOL 100 MG: 10 INJECTION, EMULSION INTRAVENOUS at 09:26

## 2024-03-01 RX ADMIN — PROPOFOL 50 MG: 10 INJECTION, EMULSION INTRAVENOUS at 09:35

## 2024-03-01 RX ADMIN — PROPOFOL 50 MG: 10 INJECTION, EMULSION INTRAVENOUS at 09:30

## 2024-03-01 RX ADMIN — PROPOFOL 50 MG: 10 INJECTION, EMULSION INTRAVENOUS at 09:39

## 2024-03-01 RX ADMIN — SODIUM CHLORIDE, SODIUM LACTATE, POTASSIUM CHLORIDE, AND CALCIUM CHLORIDE: .6; .31; .03; .02 INJECTION, SOLUTION INTRAVENOUS at 09:16

## 2024-03-01 NOTE — H&P
History and Physical - [facility]  Patricia Sanchez 73 y.o. female MRN: [id number]                  HPI: Patricia Sanchez is a 73 y.o. year old female who presents for history of polyp and MUTYH gene mutation      REVIEW OF SYSTEMS: Per the HPI, and otherwise unremarkable.    Historical Information   Past Medical History:   Diagnosis Date    Ankle fracture, right     Biallelic mutation of MUTYH gene     Colon polyp     Depression     history    Dysphagia     H/O impacted cerumen     Hyperlipidemia     last assessed 02/11/2016    Laryngopharyngeal reflux (LPR)     Muscle tension dysphonia     Throat symptom     tightness     Past Surgical History:   Procedure Laterality Date    APPENDECTOMY      CARPAL TUNNEL RELEASE Bilateral     COLONOSCOPY      2001-tubular adenoma.  08/2008-no polyps.  07/2011-polyp, diverticulosis, hemorrhoid, Dr. Gupta.  07/016-diverticulosis, Dr. Gupta.    HEMORRHOID SURGERY      multiple ligations of internal hemorrhoids, last assessed 08/16/2016    HYSTERECTOMY      Fibroids, abdominal hysterectomy age 36    TONSILLECTOMY      TUBAL LIGATION       Social History   Social History     Substance and Sexual Activity   Alcohol Use No     Social History     Substance and Sexual Activity   Drug Use No     Social History     Tobacco Use   Smoking Status Never   Smokeless Tobacco Never     Family History   Problem Relation Age of Onset    Stroke Mother 76        CVA    Heart disease Father 70        heart attacks/open heart sx    Diabetes Father 70    Thyroid disease Daughter 40    BRCA1 Negative Daughter         inconclusive-positive for colon mutation    Breast cancer Daughter 47    No Known Problems Daughter     No Known Problems Maternal Grandmother     No Known Problems Maternal Grandfather     No Known Problems Paternal Grandmother     No Known Problems Paternal Grandfather     Esophageal cancer Brother     Liver cancer Brother     Suicidality Brother         suicide  "completion    Cancer Brother 47        Liver/throat (worked in [employer])    Thyroid cancer Brother 47    Prostate cancer Brother 63    No Known Problems Maternal Aunt     No Known Problems Maternal Aunt     No Known Problems Maternal Aunt     No Known Problems Maternal Aunt     Bone cancer Paternal Aunt 79        third rib    No Known Problems Paternal Aunt     Breast cancer Half-Sister 48    Coronary artery disease Half-Sister     Cancer Half-Brother 67        multiple cancers    Cancer Other 45        multiple cancers when diagnosed    Melanoma Other         in his 40's    Stomach cancer Other 56    Alcohol abuse Neg Hx     Substance Abuse Neg Hx     Mental illness Neg Hx     Depression Neg Hx        Meds/Allergies       Current Outpatient Medications:     Calcium Carbonate-Vitamin D 600-200 MG-UNIT TABS    Omega-3 Fatty Acids (OMEGA-3 FISH OIL PO)    Pediatric Multiple Vitamins (CHEWABLE MULTIPLE VITAMINS PO)    famotidine (PEPCID) 20 mg tablet    polyethylene glycol (GaviLyte-G) 4000 mL solution    polyethylene glycol (GOLYTELY) 4000 mL solution    predniSONE 10 mg tablet    Allergies   Allergen Reactions    Paroxetine Tremor     Head tremor 2001    Kiwi Extract - Food Allergy     Sulfa Antibiotics Rash       Objective     /75   Pulse 73   Temp (!) 97.4 °F (36.3 °C) (Temporal)   Resp 16   Ht 5' 4\" (1.626 m)   Wt 69.4 kg (153 lb)   SpO2 99%   BMI 26.26 kg/m²       PHYSICAL EXAM    Gen: NAD  Head: NCAT  CV: RRR  CHEST: Clear  ABD: soft, NT/ND  EXT: no edema      ASSESSMENT/PLAN:  This is a 73 y.o. year old female here for colonoscopy, and she is stable and optimized for her procedure.        "

## 2024-03-01 NOTE — ANESTHESIA POSTPROCEDURE EVALUATION
Post-Op Assessment Note    CV Status:  Stable  Pain Score: 0    Pain management: adequate       Mental Status:  Arousable and sleepy   Hydration Status:  Stable   PONV Controlled:  Controlled   Airway Patency:  Patent     Post Op Vitals Reviewed: Yes    No anethesia notable event occurred.    Staff: CRNA               BP   105/59   Temp 97.6   Pulse 66   Resp 14   SpO2 99

## 2024-03-01 NOTE — ANESTHESIA PREPROCEDURE EVALUATION
Procedure:  COLONOSCOPY    Relevant Problems   CARDIO   (+) Pure hypercholesterolemia (No meds, dietary modification)      MUSCULOSKELETAL   (+) Primary osteoarthritis of right knee        Physical Exam    Airway    Mallampati score: II  TM Distance: >3 FB  Neck ROM: full     Dental       Cardiovascular  Rhythm: regular, Rate: normal    Pulmonary   Breath sounds clear to auscultation    Other Findings  post-pubertal.      Anesthesia Plan  ASA Score- 2     Anesthesia Type- IV sedation with anesthesia with ASA Monitors.         Additional Monitors:     Airway Plan:            Plan Factors-    Chart reviewed.        Patient is not a current smoker.              Induction- intravenous.    Postoperative Plan-     Informed Consent- Anesthetic plan and risks discussed with patient.  I personally reviewed this patient with the CRNA. Discussed and agreed on the Anesthesia Plan with the CRNA..

## 2024-03-04 PROCEDURE — 88305 TISSUE EXAM BY PATHOLOGIST: CPT | Performed by: STUDENT IN AN ORGANIZED HEALTH CARE EDUCATION/TRAINING PROGRAM

## 2024-03-04 NOTE — RESULT ENCOUNTER NOTE
Please call the patient regarding her abnormal result. Has polyp, Follow up colonscopy in 3 years  Follow up in my office as needed

## 2024-10-11 ENCOUNTER — HOSPITAL ENCOUNTER (OUTPATIENT)
Dept: RADIOLOGY | Age: 74
Discharge: HOME | End: 2024-10-11
Payer: MEDICARE

## 2024-10-11 VITALS — WEIGHT: 149 LBS | HEIGHT: 64 IN | BODY MASS INDEX: 25.44 KG/M2

## 2024-10-11 DIAGNOSIS — Z12.31 VISIT FOR SCREENING MAMMOGRAM: ICD-10-CM

## 2024-10-11 PROCEDURE — 77067 SCR MAMMO BI INCL CAD: CPT

## 2024-10-11 PROCEDURE — 77063 BREAST TOMOSYNTHESIS BI: CPT

## 2025-03-12 ENCOUNTER — TELEPHONE (OUTPATIENT)
Dept: GASTROENTEROLOGY | Facility: AMBULARY SURGERY CENTER | Age: 75
End: 2025-03-12

## 2025-03-12 NOTE — TELEPHONE ENCOUNTER
Scheduled date of EGD(as of today): 5/13/25  Physician performing EGD: Dr Diaz  Location of EGD:   Instructions reviewed with patient by: ann marie mailed   Clearances: n/a

## 2025-03-12 NOTE — TELEPHONE ENCOUNTER
Pt is due for a 2-3 yr EGD - Biallelic mutation of MUTYH gene ([facility] Dr Gupta pt).       OA Questions for EGD  Date: [  ]  Screened by: [  ]     Referring Provider: [ Dr Diaz / Dr Gupta ]     Pre-Screening: BMI [  ]    Past EGD? If yes - Date: [   ]  Physician/Facility: [   ]  Reason: [   ]     SCHEDULING STAFF: If the patient is over 75 years old, please schedule an office visit.  ·      Does the patient want to see a gastroenterologist prior to their procedure to discuss any GI symptoms? no  ·      Has the patient been hospitalized or had abdominal surgery in the past 6 months? no  ·      Does the patient use supplemental oxygen? no  ·      Does the patient take [Coumadin], [Lovenox], [Plavix], [Eliquis], [Xarelto], or other blood thinning medication? [Yes] [No] no  ·      Has the patient had a stroke, cardiac event, or stent placed in the past year? [Yes] [No] no     SCHEDULING STAFF: If patient answers NO to the above questions, then schedule the procedure. If patient answers YES to any of the above questions, then schedule an office appointment.  ·       If a repeat EGD is belated and patient declines procedure à notify provider.

## 2025-05-14 ENCOUNTER — HOSPITAL ENCOUNTER (OUTPATIENT)
Dept: GASTROENTEROLOGY | Facility: HOSPITAL | Age: 75
Setting detail: OUTPATIENT SURGERY
Discharge: HOME | End: 2025-05-14
Attending: INTERNAL MEDICINE
Payer: MEDICARE

## 2025-05-14 ENCOUNTER — ANESTHESIA EVENT (OUTPATIENT)
Dept: GASTROENTEROLOGY | Facility: HOSPITAL | Age: 75
End: 2025-05-14
Payer: MEDICARE

## 2025-05-14 ENCOUNTER — ANESTHESIA (OUTPATIENT)
Dept: GASTROENTEROLOGY | Facility: HOSPITAL | Age: 75
End: 2025-05-14
Payer: MEDICARE

## 2025-05-14 VITALS
OXYGEN SATURATION: 98 % | SYSTOLIC BLOOD PRESSURE: 125 MMHG | WEIGHT: 158 LBS | TEMPERATURE: 97 F | DIASTOLIC BLOOD PRESSURE: 68 MMHG | BODY MASS INDEX: 27.12 KG/M2 | RESPIRATION RATE: 10 BRPM | HEART RATE: 61 BPM

## 2025-05-14 DIAGNOSIS — Z15.09 BIALLELIC MUTATION OF MUTYH GENE: ICD-10-CM

## 2025-05-14 PROCEDURE — 88342 IMHCHEM/IMCYTCHM 1ST ANTB: CPT | Performed by: PATHOLOGY

## 2025-05-14 PROCEDURE — 88305 TISSUE EXAM BY PATHOLOGIST: CPT | Performed by: PATHOLOGY

## 2025-05-14 PROCEDURE — 88341 IMHCHEM/IMCYTCHM EA ADD ANTB: CPT | Performed by: PATHOLOGY

## 2025-05-14 RX ORDER — LIDOCAINE HYDROCHLORIDE 10 MG/ML
INJECTION, SOLUTION EPIDURAL; INFILTRATION; INTRACAUDAL; PERINEURAL AS NEEDED
Status: DISCONTINUED | OUTPATIENT
Start: 2025-05-14 | End: 2025-05-14

## 2025-05-14 RX ORDER — SODIUM CHLORIDE, SODIUM LACTATE, POTASSIUM CHLORIDE, CALCIUM CHLORIDE 600; 310; 30; 20 MG/100ML; MG/100ML; MG/100ML; MG/100ML
INJECTION, SOLUTION INTRAVENOUS CONTINUOUS PRN
Status: DISCONTINUED | OUTPATIENT
Start: 2025-05-14 | End: 2025-05-14

## 2025-05-14 RX ORDER — PROPOFOL 10 MG/ML
INJECTION, EMULSION INTRAVENOUS AS NEEDED
Status: DISCONTINUED | OUTPATIENT
Start: 2025-05-14 | End: 2025-05-14

## 2025-05-14 RX ORDER — PROPOFOL 10 MG/ML
INJECTION, EMULSION INTRAVENOUS CONTINUOUS PRN
Status: DISCONTINUED | OUTPATIENT
Start: 2025-05-14 | End: 2025-05-14

## 2025-05-14 RX ADMIN — PROPOFOL 150 MG: 10 INJECTION, EMULSION INTRAVENOUS at 09:27

## 2025-05-14 RX ADMIN — LIDOCAINE HYDROCHLORIDE 50 MG: 10 INJECTION, SOLUTION EPIDURAL; INFILTRATION; INTRACAUDAL at 09:26

## 2025-05-14 RX ADMIN — PROPOFOL 100 MCG/KG/MIN: 10 INJECTION, EMULSION INTRAVENOUS at 09:27

## 2025-05-14 RX ADMIN — SODIUM CHLORIDE, SODIUM LACTATE, POTASSIUM CHLORIDE, AND CALCIUM CHLORIDE: .6; .31; .03; .02 INJECTION, SOLUTION INTRAVENOUS at 09:26

## 2025-05-14 NOTE — H&P
H/o MUTYH mutationHistory and Physical - [facility]  Patricia Sanchez 74 y.o. female MRN: [id number]                  HPI: Patricia Sanchez is a 74 y.o. year old female who presents for history of gene mutation      REVIEW OF SYSTEMS: Per the HPI, and otherwise unremarkable.    Historical Information   Past Medical History:   Diagnosis Date    Ankle fracture, right     Biallelic mutation of MUTYH gene     Colon polyp     Depression     history    Dysphagia     H/O impacted cerumen     Hyperlipidemia     last assessed 02/11/2016    Laryngopharyngeal reflux (LPR)     Muscle tension dysphonia     Throat symptom     tightness     Past Surgical History:   Procedure Laterality Date    APPENDECTOMY      CARPAL TUNNEL RELEASE Bilateral     COLONOSCOPY      2001-tubular adenoma.  08/2008-no polyps.  07/2011-polyp, diverticulosis, hemorrhoid, Dr. Gupta.  07/016-diverticulosis, Dr. Gupta.    HEMORRHOID SURGERY      multiple ligations of internal hemorrhoids, last assessed 08/16/2016    HYSTERECTOMY      Fibroids, abdominal hysterectomy age 36    TONSILLECTOMY      TUBAL LIGATION       Social History   Social History     Substance and Sexual Activity   Alcohol Use No     Social History     Substance and Sexual Activity   Drug Use No     Tobacco Use History[1]  Family History   Problem Relation Age of Onset    Stroke Mother 76        CVA    Heart disease Father 70        heart attacks/open heart sx    Diabetes Father 70    Thyroid disease Daughter 40    BRCA1 Negative Daughter         inconclusive-positive for colon mutation    Breast cancer Daughter 47    No Known Problems Daughter     No Known Problems Maternal Grandmother     No Known Problems Maternal Grandfather     No Known Problems Paternal Grandmother     No Known Problems Paternal Grandfather     Esophageal cancer Brother     Liver cancer Brother     Suicidality Brother         suicide completion    Cancer Brother 47        Liver/throat (worked  in [employer]ry)    Thyroid cancer Brother 47    Prostate cancer Brother 63    No Known Problems Maternal Aunt     No Known Problems Maternal Aunt     No Known Problems Maternal Aunt     No Known Problems Maternal Aunt     Bone cancer Paternal Aunt 79        third rib    No Known Problems Paternal Aunt     Breast cancer Half-Sister 48    Coronary artery disease Half-Sister     Cancer Half-Brother 67        multiple cancers    Cancer Other 45        multiple cancers when diagnosed    Melanoma Other         in his 40's    Stomach cancer Other 56    Alcohol abuse Neg Hx     Substance Abuse Neg Hx     Mental illness Neg Hx     Depression Neg Hx        Meds/Allergies     Current Medications[2]    Allergies[3]    Objective     /67   Pulse 62   Temp 98.2 °F (36.8 °C) (Temporal)   Resp 12   Wt 71.7 kg (158 lb)   SpO2 99%   BMI 27.12 kg/m²       PHYSICAL EXAM    Gen: NAD  Head: NCAT  CV: RRR  CHEST: Clear  ABD: soft, NT/ND  EXT: no edema      ASSESSMENT/PLAN:  This is a 74 y.o. year old female here for EGD, and she is stable and optimized for her procedure.             [1]   Social History  Tobacco Use   Smoking Status Never   Smokeless Tobacco Never   [2]   Current Outpatient Medications:     Calcium Carbonate-Vitamin D 600-200 MG-UNIT TABS    Omega-3 Fatty Acids (OMEGA-3 FISH OIL PO)    Pediatric Multiple Vitamins (CHEWABLE MULTIPLE VITAMINS PO)    famotidine (PEPCID) 20 mg tablet    polyethylene glycol (GOLYTELY) 4000 mL solution    predniSONE 10 mg tablet  [3]   Allergies  Allergen Reactions    Paroxetine Tremor     Head tremor 2001    Kiwi Extract - Food Allergy     Sulfa Antibiotics Rash

## 2025-05-14 NOTE — ANESTHESIA PREPROCEDURE EVALUATION
Procedure:  EGD    Relevant Problems   ANESTHESIA (within normal limits)      CARDIO   (+) Pure hypercholesterolemia   (+) Varicose vein of leg      GI/HEPATIC   (+) Dysphagia      MUSCULOSKELETAL   (+) Primary osteoarthritis of right knee      Other   (+) Hx of adenomatous colonic polyps        Physical Exam    Airway     Mallampati score: II  TM Distance: >3 FB  Neck ROM: full      Cardiovascular      Dental   Comment: Denies loose teeth     Pulmonary      Neurological    She appears awake, alert and oriented x3.      Other Findings  post-pubertal.      Anesthesia Plan  ASA Score- 2     Anesthesia Type- IV sedation with anesthesia with ASA Monitors.         Additional Monitors:     Airway Plan:            Plan Factors-Exercise tolerance (METS): >4 METS.    Chart reviewed.   Existing labs reviewed. Patient summary reviewed.    Patient is not a current smoker.              Induction- intravenous.    Postoperative Plan- .   Monitoring Plan - Monitoring plan - standard ASA monitoring          Informed Consent- Anesthetic plan and risks discussed with patient.  I personally reviewed this patient with the CRNA. Discussed and agreed on the Anesthesia Plan with the CRNA..      NPO Status:  Vitals Value Taken Time   Date of last liquid 05/13/25 05/14/25 08:30   Time of last liquid 2000 05/14/25 08:30   Date of last solid 05/13/25 05/14/25 08:30   Time of last solid 2000 05/14/25 08:30          Vaccine Information Statement(s) was given today. This has been reviewed, questions answered, and verbal consent given by Patient for injection(s) and administration of Shingles.    Patient tolerated without incident. See immunization grid for documentation.

## 2025-05-21 PROCEDURE — 88342 IMHCHEM/IMCYTCHM 1ST ANTB: CPT | Performed by: PATHOLOGY

## 2025-05-21 PROCEDURE — 88341 IMHCHEM/IMCYTCHM EA ADD ANTB: CPT | Performed by: PATHOLOGY

## 2025-05-21 PROCEDURE — 88305 TISSUE EXAM BY PATHOLOGIST: CPT | Performed by: PATHOLOGY

## 2025-05-29 ENCOUNTER — RESULTS FOLLOW-UP (OUTPATIENT)
Dept: GASTROENTEROLOGY | Facility: CLINIC | Age: 75
End: 2025-05-29

## 2025-06-18 ENCOUNTER — NEW PATIENT COMPREHENSIVE (OUTPATIENT)
Dept: URBAN - METROPOLITAN AREA CLINIC 6 | Facility: CLINIC | Age: 75
End: 2025-06-18

## 2025-06-18 DIAGNOSIS — H31.103: ICD-10-CM

## 2025-06-18 DIAGNOSIS — H25.813: ICD-10-CM

## 2025-06-18 DIAGNOSIS — H43.811: ICD-10-CM

## 2025-06-18 PROCEDURE — 92004 COMPRE OPH EXAM NEW PT 1/>: CPT

## 2025-06-18 ASSESSMENT — VISUAL ACUITY
OD_SC: 20/20
OS_SC: 20/20

## 2025-06-18 ASSESSMENT — TONOMETRY
OS_IOP_MMHG: 11
OD_IOP_MMHG: 15